# Patient Record
Sex: MALE | Race: WHITE | NOT HISPANIC OR LATINO
[De-identification: names, ages, dates, MRNs, and addresses within clinical notes are randomized per-mention and may not be internally consistent; named-entity substitution may affect disease eponyms.]

---

## 2020-04-26 ENCOUNTER — MESSAGE (OUTPATIENT)
Age: 30
End: 2020-04-26

## 2020-05-06 ENCOUNTER — APPOINTMENT (OUTPATIENT)
Dept: DISASTER EMERGENCY | Facility: HOSPITAL | Age: 30
End: 2020-05-06

## 2020-05-07 LAB
SARS-COV-2 IGG SERPL IA-ACNC: 1.2 AU/ML
SARS-COV-2 IGG SERPL QL IA: NEGATIVE

## 2020-10-29 ENCOUNTER — TRANSCRIPTION ENCOUNTER (OUTPATIENT)
Age: 30
End: 2020-10-29

## 2020-12-07 PROBLEM — Z00.00 ENCOUNTER FOR PREVENTIVE HEALTH EXAMINATION: Status: ACTIVE | Noted: 2020-12-07

## 2020-12-24 ENCOUNTER — APPOINTMENT (OUTPATIENT)
Dept: CARDIOLOGY | Facility: CLINIC | Age: 30
End: 2020-12-24
Payer: COMMERCIAL

## 2020-12-24 ENCOUNTER — NON-APPOINTMENT (OUTPATIENT)
Age: 30
End: 2020-12-24

## 2020-12-24 VITALS
SYSTOLIC BLOOD PRESSURE: 132 MMHG | HEIGHT: 67 IN | WEIGHT: 220 LBS | OXYGEN SATURATION: 97 % | HEART RATE: 77 BPM | DIASTOLIC BLOOD PRESSURE: 88 MMHG | BODY MASS INDEX: 34.53 KG/M2 | TEMPERATURE: 98.5 F

## 2020-12-24 DIAGNOSIS — M77.8 OTHER ENTHESOPATHIES, NOT ELSEWHERE CLASSIFIED: ICD-10-CM

## 2020-12-24 DIAGNOSIS — Z78.9 OTHER SPECIFIED HEALTH STATUS: ICD-10-CM

## 2020-12-24 DIAGNOSIS — Z83.438 FAMILY HISTORY OF OTHER DISORDER OF LIPOPROTEIN METABOLISM AND OTHER LIPIDEMIA: ICD-10-CM

## 2020-12-24 DIAGNOSIS — Z82.3 FAMILY HISTORY OF STROKE: ICD-10-CM

## 2020-12-24 DIAGNOSIS — Z82.49 FAMILY HISTORY OF ISCHEMIC HEART DISEASE AND OTHER DISEASES OF THE CIRCULATORY SYSTEM: ICD-10-CM

## 2020-12-24 DIAGNOSIS — M25.511 PAIN IN RIGHT SHOULDER: ICD-10-CM

## 2020-12-24 DIAGNOSIS — K21.9 GASTRO-ESOPHAGEAL REFLUX DISEASE W/OUT ESOPHAGITIS: ICD-10-CM

## 2020-12-24 PROCEDURE — 99385 PREV VISIT NEW AGE 18-39: CPT

## 2020-12-24 PROCEDURE — 99072 ADDL SUPL MATRL&STAF TM PHE: CPT

## 2020-12-24 PROCEDURE — 93000 ELECTROCARDIOGRAM COMPLETE: CPT

## 2020-12-24 RX ORDER — FAMOTIDINE 10 MG/1
10 TABLET, FILM COATED ORAL
Refills: 0 | Status: ACTIVE | COMMUNITY
Start: 2020-12-24

## 2020-12-24 NOTE — REVIEW OF SYSTEMS
[Back Pain] : back pain [Negative] : Heme/Lymph [Joint Pain] : joint pain [FreeTextEntry6] : snoring

## 2020-12-24 NOTE — HISTORY OF PRESENT ILLNESS
[FreeTextEntry1] : Patient here to establish care and for annual physical exam.  [de-identified] : Rey is a 29yo male who presents today for his annual physical exam. Patient has PMH of GERD and back pain. He states he is feeling well, currently trying to go on disability for chronic back pain, uses Aleve prn for pain. Patient with no additional issues or concerns for today. Patient denies chest pain, shortness of breath, palpitations, dizziness, vision changes, n/v, abdominal pain, changes in bowel/bladder habits,  or appetite. pt also with right shoulder discomfort pt also with snoring /pt seeing dissiability physician

## 2020-12-24 NOTE — PHYSICAL EXAM
[No Acute Distress] : no acute distress [Well Nourished] : well nourished [Well Developed] : well developed [Well-Appearing] : well-appearing [Normal Sclera/Conjunctiva] : normal sclera/conjunctiva [PERRL] : pupils equal round and reactive to light [EOMI] : extraocular movements intact [Normal Outer Ear/Nose] : the outer ears and nose were normal in appearance [Normal Oropharynx] : the oropharynx was normal [No JVD] : no jugular venous distention [No Lymphadenopathy] : no lymphadenopathy [Supple] : supple [Thyroid Normal, No Nodules] : the thyroid was normal and there were no nodules present [No Respiratory Distress] : no respiratory distress  [No Accessory Muscle Use] : no accessory muscle use [Clear to Auscultation] : lungs were clear to auscultation bilaterally [Normal Rate] : normal rate  [Regular Rhythm] : with a regular rhythm [Normal S1, S2] : normal S1 and S2 [No Murmur] : no murmur heard [No Carotid Bruits] : no carotid bruits [No Abdominal Bruit] : a ~M bruit was not heard ~T in the abdomen [No Varicosities] : no varicosities [Pedal Pulses Present] : the pedal pulses are present [No Edema] : there was no peripheral edema [No Palpable Aorta] : no palpable aorta [No Extremity Clubbing/Cyanosis] : no extremity clubbing/cyanosis [Soft] : abdomen soft [Non Tender] : non-tender [Non-distended] : non-distended [No Masses] : no abdominal mass palpated [No HSM] : no HSM [Normal Bowel Sounds] : normal bowel sounds [Normal Posterior Cervical Nodes] : no posterior cervical lymphadenopathy [Normal Anterior Cervical Nodes] : no anterior cervical lymphadenopathy [No CVA Tenderness] : no CVA  tenderness [No Spinal Tenderness] : no spinal tenderness [No Joint Swelling] : no joint swelling [Grossly Normal Strength/Tone] : grossly normal strength/tone [No Rash] : no rash [Coordination Grossly Intact] : coordination grossly intact [No Focal Deficits] : no focal deficits [Normal Gait] : normal gait [Normal Affect] : the affect was normal [Normal Insight/Judgement] : insight and judgment were intact [de-identified] : clear discharge from nares  [de-identified] : normal genitals  [de-identified] : nevi noted

## 2021-01-26 ENCOUNTER — RX RENEWAL (OUTPATIENT)
Age: 31
End: 2021-01-26

## 2021-01-26 RX ORDER — FLUTICASONE PROPIONATE 50 UG/1
50 SPRAY, METERED NASAL
Qty: 16 | Refills: 1 | Status: ACTIVE | COMMUNITY
Start: 2020-12-24 | End: 1900-01-01

## 2021-02-22 ENCOUNTER — APPOINTMENT (OUTPATIENT)
Dept: DERMATOLOGY | Facility: CLINIC | Age: 31
End: 2021-02-22

## 2021-04-23 ENCOUNTER — RESULT REVIEW (OUTPATIENT)
Age: 31
End: 2021-04-23

## 2021-04-23 ENCOUNTER — INPATIENT (INPATIENT)
Facility: HOSPITAL | Age: 31
LOS: 1 days | Discharge: HOME | DRG: 378 | End: 2021-04-25
Attending: INTERNAL MEDICINE | Admitting: INTERNAL MEDICINE
Payer: COMMERCIAL

## 2021-04-23 VITALS
HEART RATE: 116 BPM | TEMPERATURE: 100 F | DIASTOLIC BLOOD PRESSURE: 74 MMHG | SYSTOLIC BLOOD PRESSURE: 134 MMHG | OXYGEN SATURATION: 97 % | WEIGHT: 201.94 LBS | RESPIRATION RATE: 16 BRPM | HEIGHT: 67 IN

## 2021-04-23 DIAGNOSIS — R79.89 OTHER SPECIFIED ABNORMAL FINDINGS OF BLOOD CHEMISTRY: ICD-10-CM

## 2021-04-23 DIAGNOSIS — R55 SYNCOPE AND COLLAPSE: ICD-10-CM

## 2021-04-23 DIAGNOSIS — Z29.9 ENCOUNTER FOR PROPHYLACTIC MEASURES, UNSPECIFIED: ICD-10-CM

## 2021-04-23 DIAGNOSIS — M54.9 DORSALGIA, UNSPECIFIED: ICD-10-CM

## 2021-04-23 DIAGNOSIS — K92.2 GASTROINTESTINAL HEMORRHAGE, UNSPECIFIED: ICD-10-CM

## 2021-04-23 DIAGNOSIS — R00.0 TACHYCARDIA, UNSPECIFIED: ICD-10-CM

## 2021-04-23 LAB
ALBUMIN SERPL ELPH-MCNC: 4.9 G/DL — SIGNIFICANT CHANGE UP (ref 3.3–5)
ALP SERPL-CCNC: 70 U/L — SIGNIFICANT CHANGE UP (ref 40–120)
ALT FLD-CCNC: 62 U/L — HIGH (ref 10–45)
ANION GAP SERPL CALC-SCNC: 11 MMOL/L — SIGNIFICANT CHANGE UP (ref 5–17)
APTT BLD: 24 SEC — LOW (ref 27.5–35.5)
AST SERPL-CCNC: 25 U/L — SIGNIFICANT CHANGE UP (ref 10–40)
BASE EXCESS BLDV CALC-SCNC: -1.4 MMOL/L — SIGNIFICANT CHANGE UP (ref -2–2)
BASOPHILS # BLD AUTO: 0.07 K/UL — SIGNIFICANT CHANGE UP (ref 0–0.2)
BASOPHILS NFR BLD AUTO: 0.5 % — SIGNIFICANT CHANGE UP (ref 0–2)
BILIRUB SERPL-MCNC: 0.3 MG/DL — SIGNIFICANT CHANGE UP (ref 0.2–1.2)
BUN SERPL-MCNC: 35 MG/DL — HIGH (ref 7–23)
CA-I SERPL-SCNC: 1.23 MMOL/L — SIGNIFICANT CHANGE UP (ref 1.12–1.3)
CALCIUM SERPL-MCNC: 10.4 MG/DL — SIGNIFICANT CHANGE UP (ref 8.4–10.5)
CHLORIDE BLDV-SCNC: 110 MMOL/L — HIGH (ref 96–108)
CHLORIDE SERPL-SCNC: 104 MMOL/L — SIGNIFICANT CHANGE UP (ref 96–108)
CO2 BLDV-SCNC: 24 MMOL/L — SIGNIFICANT CHANGE UP (ref 22–30)
CO2 SERPL-SCNC: 22 MMOL/L — SIGNIFICANT CHANGE UP (ref 22–31)
CREAT SERPL-MCNC: 0.8 MG/DL — SIGNIFICANT CHANGE UP (ref 0.5–1.3)
EOSINOPHIL # BLD AUTO: 0.07 K/UL — SIGNIFICANT CHANGE UP (ref 0–0.5)
EOSINOPHIL NFR BLD AUTO: 0.5 % — SIGNIFICANT CHANGE UP (ref 0–6)
GAS PNL BLDV: 136 MMOL/L — SIGNIFICANT CHANGE UP (ref 135–145)
GAS PNL BLDV: SIGNIFICANT CHANGE UP
GLUCOSE BLDV-MCNC: 102 MG/DL — HIGH (ref 70–99)
GLUCOSE SERPL-MCNC: 104 MG/DL — HIGH (ref 70–99)
HCO3 BLDV-SCNC: 23 MMOL/L — SIGNIFICANT CHANGE UP (ref 21–29)
HCT VFR BLD CALC: 33.7 % — LOW (ref 39–50)
HCT VFR BLD CALC: 44 % — SIGNIFICANT CHANGE UP (ref 39–50)
HCT VFR BLDA CALC: 40 % — SIGNIFICANT CHANGE UP (ref 39–50)
HGB BLD CALC-MCNC: 12.9 G/DL — LOW (ref 13–17)
HGB BLD-MCNC: 11.2 G/DL — LOW (ref 13–17)
HGB BLD-MCNC: 14.1 G/DL — SIGNIFICANT CHANGE UP (ref 13–17)
IMM GRANULOCYTES NFR BLD AUTO: 0.5 % — SIGNIFICANT CHANGE UP (ref 0–1.5)
INR BLD: 1.07 RATIO — SIGNIFICANT CHANGE UP (ref 0.88–1.16)
LACTATE BLDV-MCNC: 1.1 MMOL/L — SIGNIFICANT CHANGE UP (ref 0.7–2)
LYMPHOCYTES # BLD AUTO: 2.72 K/UL — SIGNIFICANT CHANGE UP (ref 1–3.3)
LYMPHOCYTES # BLD AUTO: 21.3 % — SIGNIFICANT CHANGE UP (ref 13–44)
MAGNESIUM SERPL-MCNC: 2.1 MG/DL — SIGNIFICANT CHANGE UP (ref 1.6–2.6)
MCHC RBC-ENTMCNC: 27.5 PG — SIGNIFICANT CHANGE UP (ref 27–34)
MCHC RBC-ENTMCNC: 27.8 PG — SIGNIFICANT CHANGE UP (ref 27–34)
MCHC RBC-ENTMCNC: 32 GM/DL — SIGNIFICANT CHANGE UP (ref 32–36)
MCHC RBC-ENTMCNC: 33.2 GM/DL — SIGNIFICANT CHANGE UP (ref 32–36)
MCV RBC AUTO: 83.6 FL — SIGNIFICANT CHANGE UP (ref 80–100)
MCV RBC AUTO: 85.9 FL — SIGNIFICANT CHANGE UP (ref 80–100)
MONOCYTES # BLD AUTO: 1.02 K/UL — HIGH (ref 0–0.9)
MONOCYTES NFR BLD AUTO: 8 % — SIGNIFICANT CHANGE UP (ref 2–14)
NEUTROPHILS # BLD AUTO: 8.82 K/UL — HIGH (ref 1.8–7.4)
NEUTROPHILS NFR BLD AUTO: 69.2 % — SIGNIFICANT CHANGE UP (ref 43–77)
NRBC # BLD: 0 /100 WBCS — SIGNIFICANT CHANGE UP (ref 0–0)
NRBC # BLD: 0 /100 WBCS — SIGNIFICANT CHANGE UP (ref 0–0)
OTHER CELLS CSF MANUAL: 13 ML/DL — LOW (ref 18–22)
PCO2 BLDV: 41 MMHG — LOW (ref 42–55)
PH BLDV: 7.37 — SIGNIFICANT CHANGE UP (ref 7.35–7.45)
PLATELET # BLD AUTO: 320 K/UL — SIGNIFICANT CHANGE UP (ref 150–400)
PLATELET # BLD AUTO: 357 K/UL — SIGNIFICANT CHANGE UP (ref 150–400)
PO2 BLDV: 40 MMHG — SIGNIFICANT CHANGE UP (ref 25–45)
POTASSIUM BLDV-SCNC: 3.9 MMOL/L — SIGNIFICANT CHANGE UP (ref 3.5–5.3)
POTASSIUM SERPL-MCNC: 4.4 MMOL/L — SIGNIFICANT CHANGE UP (ref 3.5–5.3)
POTASSIUM SERPL-SCNC: 4.4 MMOL/L — SIGNIFICANT CHANGE UP (ref 3.5–5.3)
PROT SERPL-MCNC: 8.1 G/DL — SIGNIFICANT CHANGE UP (ref 6–8.3)
PROTHROM AB SERPL-ACNC: 12.8 SEC — SIGNIFICANT CHANGE UP (ref 10.6–13.6)
RBC # BLD: 4.03 M/UL — LOW (ref 4.2–5.8)
RBC # BLD: 5.12 M/UL — SIGNIFICANT CHANGE UP (ref 4.2–5.8)
RBC # BLD: 5.12 M/UL — SIGNIFICANT CHANGE UP (ref 4.2–5.8)
RBC # FLD: 12.7 % — SIGNIFICANT CHANGE UP (ref 10.3–14.5)
RBC # FLD: 12.7 % — SIGNIFICANT CHANGE UP (ref 10.3–14.5)
RETICS #: 91.1 K/UL — SIGNIFICANT CHANGE UP (ref 25–125)
RETICS/RBC NFR: 1.8 % — SIGNIFICANT CHANGE UP (ref 0.5–2.5)
SAO2 % BLDV: 74 % — SIGNIFICANT CHANGE UP (ref 67–88)
SARS-COV-2 RNA SPEC QL NAA+PROBE: SIGNIFICANT CHANGE UP
SODIUM SERPL-SCNC: 137 MMOL/L — SIGNIFICANT CHANGE UP (ref 135–145)
WBC # BLD: 12.77 K/UL — HIGH (ref 3.8–10.5)
WBC # BLD: 17.95 K/UL — HIGH (ref 3.8–10.5)
WBC # FLD AUTO: 12.77 K/UL — HIGH (ref 3.8–10.5)
WBC # FLD AUTO: 17.95 K/UL — HIGH (ref 3.8–10.5)

## 2021-04-23 PROCEDURE — 71046 X-RAY EXAM CHEST 2 VIEWS: CPT | Mod: 26

## 2021-04-23 PROCEDURE — 88305 TISSUE EXAM BY PATHOLOGIST: CPT | Mod: 26

## 2021-04-23 PROCEDURE — 99285 EMERGENCY DEPT VISIT HI MDM: CPT

## 2021-04-23 PROCEDURE — 93010 ELECTROCARDIOGRAM REPORT: CPT

## 2021-04-23 PROCEDURE — 99221 1ST HOSP IP/OBS SF/LOW 40: CPT | Mod: GC

## 2021-04-23 RX ORDER — SODIUM CHLORIDE 9 MG/ML
500 INJECTION INTRAMUSCULAR; INTRAVENOUS; SUBCUTANEOUS
Refills: 0 | Status: COMPLETED | OUTPATIENT
Start: 2021-04-23 | End: 2021-04-23

## 2021-04-23 RX ORDER — PANTOPRAZOLE SODIUM 20 MG/1
8 TABLET, DELAYED RELEASE ORAL
Qty: 80 | Refills: 0 | Status: DISCONTINUED | OUTPATIENT
Start: 2021-04-23 | End: 2021-04-25

## 2021-04-23 RX ORDER — PANTOPRAZOLE SODIUM 20 MG/1
40 TABLET, DELAYED RELEASE ORAL ONCE
Refills: 0 | Status: COMPLETED | OUTPATIENT
Start: 2021-04-23 | End: 2021-04-23

## 2021-04-23 RX ORDER — ACETAMINOPHEN 500 MG
1000 TABLET ORAL ONCE
Refills: 0 | Status: COMPLETED | OUTPATIENT
Start: 2021-04-23 | End: 2021-04-23

## 2021-04-23 RX ORDER — PANTOPRAZOLE SODIUM 20 MG/1
80 TABLET, DELAYED RELEASE ORAL ONCE
Refills: 0 | Status: COMPLETED | OUTPATIENT
Start: 2021-04-23 | End: 2021-04-23

## 2021-04-23 RX ORDER — SODIUM CHLORIDE 9 MG/ML
1000 INJECTION, SOLUTION INTRAVENOUS
Refills: 0 | Status: DISCONTINUED | OUTPATIENT
Start: 2021-04-23 | End: 2021-04-25

## 2021-04-23 RX ORDER — SODIUM CHLORIDE 9 MG/ML
1000 INJECTION, SOLUTION INTRAVENOUS ONCE
Refills: 0 | Status: COMPLETED | OUTPATIENT
Start: 2021-04-23 | End: 2021-04-23

## 2021-04-23 RX ADMIN — Medication 400 MILLIGRAM(S): at 18:42

## 2021-04-23 RX ADMIN — SODIUM CHLORIDE 150 MILLILITER(S): 9 INJECTION, SOLUTION INTRAVENOUS at 14:55

## 2021-04-23 RX ADMIN — PANTOPRAZOLE SODIUM 10 MG/HR: 20 TABLET, DELAYED RELEASE ORAL at 15:18

## 2021-04-23 RX ADMIN — SODIUM CHLORIDE 30 MILLILITER(S): 9 INJECTION INTRAMUSCULAR; INTRAVENOUS; SUBCUTANEOUS at 18:35

## 2021-04-23 RX ADMIN — PANTOPRAZOLE SODIUM 40 MILLIGRAM(S): 20 TABLET, DELAYED RELEASE ORAL at 13:54

## 2021-04-23 RX ADMIN — SODIUM CHLORIDE 4000 MILLILITER(S): 9 INJECTION, SOLUTION INTRAVENOUS at 13:36

## 2021-04-23 RX ADMIN — Medication 1000 MILLIGRAM(S): at 19:02

## 2021-04-23 RX ADMIN — PANTOPRAZOLE SODIUM 40 MILLIGRAM(S): 20 TABLET, DELAYED RELEASE ORAL at 13:37

## 2021-04-23 NOTE — CONSULT NOTE ADULT - PROBLEM SELECTOR RECOMMENDATION 9
Suspect NSAID related ulcer causing upper GI bleed  -IV fluid boluses until vital signs normal  -CBC q8h  -protonix 80mg IV bolus then infusion 8mg/h  -EGD today

## 2021-04-23 NOTE — ED ADULT TRIAGE NOTE - CHIEF COMPLAINT QUOTE
black stools had near syncope this morning when trying to urinate   states he takes aleve almost every other day

## 2021-04-23 NOTE — ED ADULT NURSE NOTE - OBJECTIVE STATEMENT
30 year old male presents to ED c/o dark stools x 2 today with 1 episode of nausea and dizziness. Patient is hypertensive and tachycardic on the monitor. Patient denies and pain or symptoms at this time. No PMH. Does not take any medications. IV fluids in progress.

## 2021-04-23 NOTE — PRE PROCEDURE NOTE - PRE PROCEDURE EVALUATION
Attending Physician:                            Procedure:    Indication for Procedure:  ________________________________________________________  PAST MEDICAL & SURGICAL HISTORY:  No pertinent past medical history    No significant past surgical history      ALLERGIES:  No Known Allergies    HOME MEDICATIONS:    AICD/PPM: [ ] yes   [ ] no    PERTINENT LAB DATA:                        14.1   12.77 )-----------( 357      ( 23 Apr 2021 12:47 )             44.0     04-23    137  |  104  |  35<H>  ----------------------------<  104<H>  4.4   |  22  |  0.80    Ca    10.4      23 Apr 2021 12:48  Mg     2.1     04-23    TPro  8.1  /  Alb  4.9  /  TBili  0.3  /  DBili  x   /  AST  25  /  ALT  62<H>  /  AlkPhos  70  04-23    PT/INR - ( 23 Apr 2021 12:47 )   PT: 12.8 sec;   INR: 1.07 ratio         PTT - ( 23 Apr 2021 12:47 )  PTT:24.0 sec            PHYSICAL EXAMINATION:    Height (cm): 170.2  Weight (kg): 91.6  BMI (kg/m2): 31.6  BSA (m2): 2.03T(C): 36.1  HR: 106  BP: 108/71  RR: 18  SpO2: 97%    Constitutional: NAD  HEENT: PERRLA, EOMI,    Neck:  No JVD  Respiratory: CTAB/L  Cardiovascular: S1 and S2  Gastrointestinal: BS+, soft, NT/ND  Extremities: No peripheral edema  Neurological: A/O x 3, no focal deficits  Psychiatric: Normal mood, normal affect  Skin: No rashes    ASA Class: I [ x]  II [ ]  III [ ]  IV [ ]    COMMENTS:    The patient is a suitable candidate for the planned procedure unless box checked [ ]  No, explain:

## 2021-04-23 NOTE — H&P ADULT - PROBLEM SELECTOR PLAN 1
acute onset black stool and abdominal pain  GI eval called,   start IV hydration and protonix drip   serial CBC Q12hrs for now  active T7S, transfuse to keep hgb above 7  Keep NPO  plan for EGD per GI

## 2021-04-23 NOTE — PRE-ANESTHESIA EVALUATION ADULT - NSANTHOSAYNRD_GEN_A_CORE
No. DELISA screening performed.  STOP BANG Legend: 0-2 = LOW Risk; 3-4 = INTERMEDIATE Risk; 5-8 = HIGH Risk

## 2021-04-23 NOTE — CONSULT NOTE ADULT - SUBJECTIVE AND OBJECTIVE BOX
CHIEF COMPLAINT:Patient is a 30y old  Male who presents with a chief complaint of GIB (23 Apr 2021 14:22)      HISTORY OF PRESENT ILLNESS:HPI:  Patient is a 31 Y/O Male with no pertinent PMH p/w lightheadedness starting this AM. Pt reports going to urinate this AM and starting to feel lightheaded. Denies chest pain, sob, palpitations. The symptoms improved after a few seconds of sitting down. He took a nap and when he awoke he has mild epigastric discomfort and urge to have a BM. Reports two dark colored BMs without obvious blood. Denies symptoms at this time. States that he take naproxen a few times per week as directed on the packaging for back pain. Denies n/v, fevers, chills. Denies having colonoscopy/endoscopy in the past. No hx of colon cancer in the family that he knows of. (23 Apr 2021 14:22)      PAST MEDICAL & SURGICAL HISTORY:  No pertinent past medical history  No significant past surgical history    MEDICATIONS:  pantoprazole Infusion 8 mG/Hr IV Continuous <Continuous>  lactated ringers. 1000 milliLiter(s) IV Continuous <Continuous>    FAMILY HISTORY  Non-contributory    SOCIAL HISTORY:    [ ] Tobacco  [ ] Drugs  [ ] Alcohol    Allergies  No Known Allergies  Intolerances    REVIEW OF SYSTEMS:  CONSTITUTIONAL: No fever  EYES: No eye pain, visual disturbances, or discharge  ENMT:  No difficulty hearing, tinnitus  NECK: No pain or stiffness  RESPIRATORY: No cough, wheezing,  CARDIOVASCULAR: No chest pain, palpitations, passing out, dizziness, or leg swelling  GASTROINTESTINAL:  No nausea, vomiting, diarrhea or constipation. No melena. + GIB   GENITOURINARY: No dysuria, hematuria  NEUROLOGICAL: No stroke like symptoms  SKIN: No burning or lesions   ENDOCRINE: No heat or cold intolerance  MUSCULOSKELETAL: No joint pain or swelling  PSYCHIATRIC: No  anxiety, mood swings  HEME/LYMPH: No bleeding gums  ALLERGY AND IMMUNOLOGIC: No hives or eczema	    All other ROS negative    PHYSICAL EXAM:  T(C): 37.5 (04-23-21 @ 11:58), Max: 37.5 (04-23-21 @ 11:58)  HR: 116 (04-23-21 @ 11:58) (116 - 116)  BP: 134/74 (04-23-21 @ 11:58) (134/74 - 134/74)  RR: 16 (04-23-21 @ 11:58) (16 - 16)  SpO2: 97% (04-23-21 @ 11:58) (97% - 97%)  Wt(kg): --  I&O's Summary      Appearance: Normal	  HEENT:   Normal oral mucosa, EOMI	  Cardiovascular:  S1 S2, No JVD,    Respiratory: Lungs clear to auscultation	  Psychiatry: Alert  Gastrointestinal:  Soft, Non-tender, + BS	  Skin: No rashes   Neurologic: Non-focal  Extremities:  No edema  Vascular: Peripheral pulses palpable    	    	  CARDIAC MARKERS:  Labs personally reviewed by me                          14.1   12.77 )-----------( 357      ( 23 Apr 2021 12:47 )             44.0     04-23    137  |  104  |  35<H>  ----------------------------<  104<H>  4.4   |  22  |  0.80    Ca    10.4      23 Apr 2021 12:48  Mg     2.1     04-23    TPro  8.1  /  Alb  4.9  /  TBili  0.3  /  DBili  x   /  AST  25  /  ALT  62<H>  /  AlkPhos  70  04-23    EKG: Personally reviewed by me -   Radiology: Personally reviewed by me -     Assessment /Plan:   Patient is a 31 Y/O Male with no pertinent PMH p/w lightheadedness starting this AM. Pt reports going to urinate this AM and starting to feel lightheaded. Denies chest pain, sob, palpitations. The symptoms improved after a few seconds of sitting down. He took a nap and when he awoke he has mild epigastric discomfort and urge to have a BM. Reports two dark colored BM's without obvious blood.     Problem/Plan - 1:  ·  Problem: Near syncope, while urinating, likely vasovagal  monitor vital   workup for GIB        Problem/Plan - 2:  ·  Problem: GIB (gastrointestinal bleeding).  Plan: Acute onset black stool and abdominal pain  IV hydration and protonix drip   Keep NPO, plan for EGD per GI.   pending GI eval        Problem/Plan - 3:  ·  Problem: Prophylactic measure.  Plan: DVT and GI PPX         Michelle Gerber ANP-C  Spencer Samuels, DO [facility]  Cardiovascular Medicine  [address] [place] [address], [address]  Office [phone]  Cell [phone] Date of Service: 4/23/21    CHIEF COMPLAINT: Patient is a 30y old  Male who presents with a chief complaint of GIB (23 Apr 2021 14:22)      HISTORY OF PRESENT ILLNESS:HPI:  Patient is a 29 Y/O Male with no pertinent PMH p/w lightheadedness starting this AM. Pt reports going to urinate this AM and starting to feel lightheaded. Denies chest pain, sob, palpitations. The symptoms improved after a few seconds of sitting down. He took a nap and when he awoke he has mild epigastric discomfort and urge to have a BM. Reports two dark colored BMs without obvious blood. Denies symptoms at this time. States that he take naproxen a few times per week as directed on the packaging for back pain. Denies n/v, fevers, chills. Denies having colonoscopy/endoscopy in the past. No hx of colon cancer in the family that he knows of. (23 Apr 2021 14:22)      PAST MEDICAL & SURGICAL HISTORY:  No pertinent past medical history  No significant past surgical history    MEDICATIONS:  pantoprazole Infusion 8 mG/Hr IV Continuous <Continuous>  lactated ringers. 1000 milliLiter(s) IV Continuous <Continuous>    FAMILY HISTORY  Non-contributory    SOCIAL HISTORY:    not a smoker    Allergies  No Known Allergies  Intolerances    REVIEW OF SYSTEMS:  CONSTITUTIONAL: No fever  EYES: No eye pain, visual disturbances, or discharge  ENMT:  No difficulty hearing, tinnitus  NECK: No pain or stiffness  RESPIRATORY: No cough, wheezing,  CARDIOVASCULAR: No chest pain, palpitations, passing out, dizziness, or leg swelling  GASTROINTESTINAL:  No nausea, vomiting, diarrhea or constipation. No melena. + GIB   GENITOURINARY: No dysuria, hematuria  NEUROLOGICAL: No stroke like symptoms  SKIN: No burning or lesions   ENDOCRINE: No heat or cold intolerance  MUSCULOSKELETAL: No joint pain or swelling  PSYCHIATRIC: No  anxiety, mood swings  HEME/LYMPH: No bleeding gums  ALLERGY AND IMMUNOLOGIC: No hives or eczema	    All other ROS negative    PHYSICAL EXAM:  T(C): 37.5 (04-23-21 @ 11:58), Max: 37.5 (04-23-21 @ 11:58)  HR: 116 (04-23-21 @ 11:58) (116 - 116)  BP: 134/74 (04-23-21 @ 11:58) (134/74 - 134/74)  RR: 16 (04-23-21 @ 11:58) (16 - 16)  SpO2: 97% (04-23-21 @ 11:58) (97% - 97%)  Wt(kg): --  I&O's Summary      Appearance: Normal	  HEENT:   Normal oral mucosa, EOMI	  Cardiovascular:  S1 S2, No JVD,    Respiratory: Lungs clear to auscultation	  Psychiatry: Alert  Gastrointestinal:  Soft, Non-tender, + BS	  Skin: No rashes   Neurologic: Non-focal  Extremities:  No edema  Vascular: Peripheral pulses palpable    	    	  CARDIAC MARKERS:  Labs personally reviewed by me                          14.1   12.77 )-----------( 357      ( 23 Apr 2021 12:47 )             44.0     04-23    137  |  104  |  35<H>  ----------------------------<  104<H>  4.4   |  22  |  0.80    Ca    10.4      23 Apr 2021 12:48  Mg     2.1     04-23    TPro  8.1  /  Alb  4.9  /  TBili  0.3  /  DBili  x   /  AST  25  /  ALT  62<H>  /  AlkPhos  70  04-23    EKG: Personally reviewed by me -   Radiology: Personally reviewed by me -     Assessment /Plan:   Patient is a 29 Y/O Male with no pertinent PMH p/w lightheadedness starting this AM. Pt reports going to urinate this AM and starting to feel lightheaded. Denies chest pain, sob, palpitations. The symptoms improved after a few seconds of sitting down. He took a nap and when he awoke he has mild epigastric discomfort and urge to have a BM. Reports two dark colored BM's without obvious blood.     Problem/Plan - 1:  ·  Problem: Near syncope, while urinating, likely vasovagal 2/2 upper GIB  monitor vital   workup for GIB        Problem/Plan - 2:  ·  Problem: GIB (gastrointestinal bleeding).  Plan: Acute onset black stool and abdominal pain  IV hydration and protonix drip   Keep NPO, plan for EGD per GI.   GI eval for EGD     Problem/Plan - 3:  ·  Problem: Preop Risk stratification  Plan: Low risk for low risk EGD procedure, no contraindication to proceed       Problem/Plan - 4:  ·  Problem: Prophylactic measure.  Plan: DVT and GI PPX     I had a conversation with the patient regarding hospital course, differential diagnosis and results of diagnostic tests thus far.  Plan of care discussed with patient after the evaluation. Patient expresses clear understanding and satisfaction with the plan of care. One hundred ten minutes spent on total encounter, of which more than fifty percent of the encounter was spent on counseling and/or coordinating care by the attending physician.      Michelle Samuels DO [facility]  Cardiovascular Medicine  [address] [place] [address], [address]  Office [phone]  Cell [phone]

## 2021-04-23 NOTE — H&P ADULT - NSHPREVIEWOFSYSTEMS_GEN_ALL_CORE
REVIEW OF SYSTEMS:    CONSTITUTIONAL: + weakness  EYES/ENT: No visual changes;  No vertigo or throat pain   NECK: No pain or stiffness  RESPIRATORY: No cough, wheezing, hemoptysis; No shortness of breath  CARDIOVASCULAR: No chest pain or palpitations  GASTROINTESTINAL: + dark black stool   GENITOURINARY: No dysuria, frequency or hematuria  NEUROLOGICAL: + lightheaded   SKIN: No itching, burning, rashes, or lesions   All other review of systems is negative unless indicated above.

## 2021-04-23 NOTE — ED ADULT TRIAGE NOTE - BP NONINVASIVE DIASTOLIC (MM HG)
"Zio report received noting VT, ?CHB, 2*AVB.     Telephoned patient to inquire on symptoms. He states he has been feeling well. Denies dizziness, lightheadedness. He states he has been feeling tired, but that is unchanged from before valve \"but maybe a little better.\" He checks his BP at home and states it's been 110-120/70's. He states he has fallen at home, 2-3 times after discharge but states \"I just lost my balance.\" No injuries or bruising from these falls. He denies any lightheadedness, near-syncope. He is taking his amlodipine and lopressor as ordered. Will review with Dr. Colbert and call patient back with recommendations.    Cristina Reynolds RN  [facility]    Dr. Perez has reviewed patient's ZIO patch results.  1/29/2021 patient had 2nd degree AVB, mobitz I.  He was asymptomatic.  He is s/p TAVR 2 weeks with Goldstein valve.  Dr. Perez wants patient to stop his Toprol 12.5 mg bid today and then recheck event monitor in 2 weeks.  I called patient and he verbalized understanding.      " 74

## 2021-04-23 NOTE — CONSULT NOTE ADULT - ASSESSMENT
30 year old man presents with dizziness and melanotic stool, tachy in ED     Recommendations:  -continue to monitor hemodynamics  -trend H/H  -Resuscitation  -EGD with GI today    -d/w Dr. Holden Smith, R3  # [id number]

## 2021-04-23 NOTE — H&P ADULT - HISTORY OF PRESENT ILLNESS
Patient is a 31 Y/O Male with no pertinent PMH p/w lightheadedness starting this AM. Pt reports going to urinate this AM and starting to feel lightheaded. Denies chest pain, sob, palpitations. The symptoms improved after a few seconds of sitting down. He took a nap and when he awoke he has mild epigastric discomfort and urge to have a BM. Reports two dark colored BMs without obvious blood. Denies symptoms at this time. States that he take naproxen a few times per week as directed on the packaging for back pain. Denies n/v, fevers, chills. Denies having colonoscopy/endoscopy in the past. No hx of colon cancer in the family that he knows of.

## 2021-04-23 NOTE — ED PROVIDER NOTE - OBJECTIVE STATEMENT
31 yo M with no pertinent PMH p/w lightheadedness starting this AM. Pt reports going to urinate this AM and starting to feel lightheaded. Denies chest pain, sob, palpitations. The symptoms improved after a few seconds of sitting down. He took a nap and when he awoke he has mild epigastric discomfort and urge to have a BM. Reports two dark colored BMs without obvious blood. Denies symptoms at this time. States that he take naproxen a few times per week as directed on the packaging for back pain. Denies n/v, fevers, chills. Denies having colonoscopy/endoscopy in the past. No hx of colon cancer in the family that he knows of.

## 2021-04-23 NOTE — CONSULT NOTE ADULT - PROBLEM SELECTOR RECOMMENDATION 5
Most likely due to upper GI bleed  -management as above, for EGD today      Plan of care discussed with the patient after the evaluation. Patient expresses a clear understanding of the plan of care.  125 minutes spent on the total encounter, of which more than fifty percent of the encounter was spent on counseling and/or coordinating care by the attending physician.  Advanced care planning forms were discussed. Code status including forceful chest compressions, defibrillation and intubation were discussed. The risks benefits and alternatives to pertinent gastrointestinal procedures and interventions were discussed in detail and all questions were answered. Duration: 15 Minutes.      Jorge Joe M.D.   Gastroenterology and Hepatology  [address] [place], [place]  Office: [phone]  Cell: [phone]

## 2021-04-23 NOTE — ED PROVIDER NOTE - INTERPRETATION
borderline tachycardia/normal sinus rhythm, Normal axis, Normal WV interval and QRS complex. There are no acute ischemic ST or T-wave changes.

## 2021-04-23 NOTE — H&P ADULT - ASSESSMENT
Patient is a 29 Y/O Male with no pertinent PMH p/w lightheadedness starting this AM. Pt reports going to urinate this AM and starting to feel lightheaded. Denies chest pain, sob, palpitations. The symptoms improved after a few seconds of sitting down. He took a nap and when he awoke he has mild epigastric discomfort and urge to have a BM. Reports two dark colored BMs without obvious blood.

## 2021-04-23 NOTE — ED PROVIDER NOTE - ATTENDING CONTRIBUTION TO CARE
------------ATTENDING NOTE------------  pt c/o feeling lightheaded this AM while standing urinating, describing sudden onset sensation of near passing out, felt clammy, no chest pain/discomfort or sob/dyspnea or palpitations, improved w/ laying down, took a nap then awoke and felt urge to have a BM, describes having to strain w/ BM and describes stool looked dark (no red/gross blood), felt better, later had another BM w/ looser stool that he describes as darker (again, no red/gross blood), no current abd pain/discomfort, has GERD at times, uses Naproxen several times a week for back pain, no h/o PUD, overall well appearing, sinus tachycardia to low 100's, abd soft nontender/nondistended +bs, equal dsital pulses, clear chest w/o distress, awaiting labs/imaging and close reassessments -->  - Corbin Brian MD   -----------------------------------------------

## 2021-04-23 NOTE — ED PROVIDER NOTE - PHYSICAL EXAMINATION
Gen: Patient is well-appearing, NAD, AAOx3, able to ambulate without assistance.  HEENT: NCAT, EOMI, RENAY, normal conjunctiva, tongue midline, oral mucosa moist.  Lung: CTAB, no respiratory distress, no wheezes/rhonchi/rales B/L, speaking in full sentences.  CV: Tachycardic, no murmurs, rubs or gallops, distal pulses 2+ b/l.  Abd: soft, NT, ND, no guarding, no rigidity, no rebound tenderness.  MSK: no visible deformities, ROM normal in UE/LE, no back TTP.  Neuro: No focal sensory or motor deficits.  Skin: Warm, well perfused, no rash, no leg swelling.  Psych: normal affect, calm.

## 2021-04-23 NOTE — H&P ADULT - PROBLEM/PLAN-1
Advised patient that we needed another specimen. She will come in tomorrow and give us one. Appointment scheduled.   
Please have the patient return to clinic for repeat urinalysis and urine culture and pending those results we can decide if more abx are indicated.  
DISPLAY PLAN FREE TEXT

## 2021-04-23 NOTE — ED PROVIDER NOTE - NS ED ROS FT
Constitutional:  (-) fever, (-) chills, (-) lethargy  Eyes:  (-) eye pain (-) visual changes  ENMT: (-) nasal discharge, (-) sore throat. (-) neck pain or stiffness  Cardiac: (-) chest pain (-) palpitations  Respiratory:  (-) cough (-) respiratory distress.   GI:  (-) nausea (-) vomiting (+) diarrhea (+) abdominal pain.  :  (-) dysuria (-) frequency (-) burning.  MS:  (-) back pain (-) joint pain.  Neuro:  (-) headache (-) numbness (-) tingling (-) focal weakness  Skin:  (-) rash  Except as documented in the HPI,  all other systems are negative

## 2021-04-23 NOTE — CONSULT NOTE ADULT - SUBJECTIVE AND OBJECTIVE BOX
Chief Complaint:  Patient is a 30y old  Male who presents with a chief complaint of     Date of service: 04-23-21 @ 13:30    HPI:    The patient is a     The patient denies dysphagia, nausea and vomiting, abdominal pain, diarrhea, unintentional weight loss, change in bowel habits or NSAID use.      Allergies:  No Known Allergies      Home Medications:    Hospital Medications:  lactated ringers Bolus 1000 milliLiter(s) IV Bolus once  lactated ringers. 1000 milliLiter(s) IV Continuous <Continuous>  pantoprazole  Injectable 40 milliGRAM(s) IV Push Once      PMHX/PSHX:  No pertinent past medical history    No significant past surgical history        Family history:      Social History:   Denies ethanol use.  Denies illicit drug use.    ROS:     General:  No wt loss, fevers, chills, night sweats, fatigue,   Eyes:  Good vision, no reported pain  ENT:  No sore throat, pain, runny nose, dysphagia  CV:  No pain, palpitations, hypo/hypertension  Resp:  No dyspnea, cough, tachypnea, wheezing  GI:  See HPI  :  No pain, bleeding, incontinence, nocturia  Muscle:  No pain, weakness  Neuro:  No weakness, tingling, memory problems  Psych:  No fatigue, insomnia, mood problems, depression  Endocrine:  No polyuria, polydipsia, cold/heat intolerance  Heme:  No petechiae, ecchymosis, easy bruisability  Integumentary:  No rash, edema      PHYSICAL EXAM:     GENERAL:  Appears stated age, well-groomed, well-nourished, no distress  HEENT:  NC/AT,  conjunctivae anicteric, clear and pink,   NECK: supple, trachea midline  CHEST:  Full & symmetric excursion, no increased effort, breath sounds clear  HEART:  Regular rhythm, no JVD  ABDOMEN:  Soft, non-tender, non-distended, normoactive bowel sounds,  no masses , no hepatosplenomegaly  EXTREMITIES:  no cyanosis,clubbing or edema  SKIN:  No rash, erythema, or, ecchymoses, no jaundice  NEURO:  Alert, non-focal, no asterixis  PSYCH: Appropriate affect, oriented to place and time  RECTAL: Deferred      Vital Signs:  Vital Signs Last 24 Hrs  T(C): 37.5 (23 Apr 2021 11:58), Max: 37.5 (23 Apr 2021 11:58)  T(F): 99.5 (23 Apr 2021 11:58), Max: 99.5 (23 Apr 2021 11:58)  HR: 116 (23 Apr 2021 11:58) (116 - 116)  BP: 134/74 (23 Apr 2021 11:58) (134/74 - 134/74)  BP(mean): --  RR: 16 (23 Apr 2021 11:58) (16 - 16)  SpO2: 97% (23 Apr 2021 11:58) (97% - 97%)  Daily Height in cm: 170.18 (23 Apr 2021 11:58)    Daily     LABS: Labs personally reviewed by me:                        14.1   12.77 )-----------( 357      ( 23 Apr 2021 12:47 )             44.0     04-23    137  |  104  |  35<H>  ----------------------------<  104<H>  4.4   |  22  |  0.80    Ca    10.4      23 Apr 2021 12:48  Mg     2.1     04-23    TPro  8.1  /  Alb  4.9  /  TBili  0.3  /  DBili  x   /  AST  25  /  ALT  62<H>  /  AlkPhos  70  04-23    LIVER FUNCTIONS - ( 23 Apr 2021 12:48 )  Alb: 4.9 g/dL / Pro: 8.1 g/dL / ALK PHOS: 70 U/L / ALT: 62 U/L / AST: 25 U/L / GGT: x           PT/INR - ( 23 Apr 2021 12:47 )   PT: 12.8 sec;   INR: 1.07 ratio         PTT - ( 23 Apr 2021 12:47 )  PTT:24.0 sec        Imaging personally reviewed by me:           Chief Complaint:  Patient is a 30y old  Male who presents with a chief complaint of melena    Date of service: 04-23-21 @ 13:30    HPI:    The patient is a 31 Y/O Male with no pertinent PMH p/w lightheadedness starting this AM. Pt reports going to urinate this AM and starting to feel lightheaded. Denies chest pain, sob, palpitations. The symptoms improved after a few seconds of sitting down. He took a nap and when he awoke he has mild epigastric discomfort and urge to have a BM. Reports two dark tarry BMs without obvious blood. Denies symptoms at this time. States that he take naproxen a few times per week as directed on the packaging for back pain. Denies n/v, fevers, chills. Denies having colonoscopy/endoscopy in the past  The patient denies dysphagia, nausea and vomiting,diarrhea, unintentional weight loss, change in bowel habits or NSAID use.    In the ED was noted to be tachycardic, with a normal hemoglobin level    Allergies:  No Known Allergies      Home Medications:    Hospital Medications:  lactated ringers Bolus 1000 milliLiter(s) IV Bolus once  lactated ringers. 1000 milliLiter(s) IV Continuous <Continuous>  pantoprazole  Injectable 40 milliGRAM(s) IV Push Once      PMHX/PSHX:  No pertinent past medical history    No significant past surgical history        Family history:      Social History:   Denies ethanol use.  Denies illicit drug use.    ROS:     General:  No wt loss, fevers, chills, night sweats, fatigue,   Eyes:  Good vision, no reported pain  ENT:  No sore throat, pain, runny nose, dysphagia  CV:  No pain, palpitations, hypo/hypertension  Resp:  No dyspnea, cough, tachypnea, wheezing  GI:  See HPI  :  No pain, bleeding, incontinence, nocturia  Muscle:  No pain, weakness  Neuro:  No weakness, tingling, memory problems  Psych:  No fatigue, insomnia, mood problems, depression  Endocrine:  No polyuria, polydipsia, cold/heat intolerance  Heme:  No petechiae, ecchymosis, easy bruisability  Integumentary:  No rash, edema      PHYSICAL EXAM:     GENERAL:  Appears stated age, well-groomed, well-nourished, no distress  HEENT:  NC/AT,  conjunctivae anicteric, clear and pink,   NECK: supple, trachea midline  CHEST:  Full & symmetric excursion, no increased effort, breath sounds clear  HEART:  Regular rhythm, no JVD, tachy  ABDOMEN:  Soft, non-tender, non-distended, normoactive bowel sounds,  no masses , no hepatosplenomegaly  EXTREMITIES:  no cyanosis,clubbing or edema  SKIN:  No rash, erythema, or, ecchymoses, no jaundice  NEURO:  Alert, non-focal, no asterixis  PSYCH: Appropriate affect, oriented to place and time  RECTAL: Deferred      Vital Signs:  Vital Signs Last 24 Hrs  T(C): 37.5 (23 Apr 2021 11:58), Max: 37.5 (23 Apr 2021 11:58)  T(F): 99.5 (23 Apr 2021 11:58), Max: 99.5 (23 Apr 2021 11:58)  HR: 116 (23 Apr 2021 11:58) (116 - 116)  BP: 134/74 (23 Apr 2021 11:58) (134/74 - 134/74)  BP(mean): --  RR: 16 (23 Apr 2021 11:58) (16 - 16)  SpO2: 97% (23 Apr 2021 11:58) (97% - 97%)  Daily Height in cm: 170.18 (23 Apr 2021 11:58)    Daily     LABS: Labs personally reviewed by me:                        14.1   12.77 )-----------( 357      ( 23 Apr 2021 12:47 )             44.0     04-23    137  |  104  |  35<H>  ----------------------------<  104<H>  4.4   |  22  |  0.80    Ca    10.4      23 Apr 2021 12:48  Mg     2.1     04-23    TPro  8.1  /  Alb  4.9  /  TBili  0.3  /  DBili  x   /  AST  25  /  ALT  62<H>  /  AlkPhos  70  04-23    LIVER FUNCTIONS - ( 23 Apr 2021 12:48 )  Alb: 4.9 g/dL / Pro: 8.1 g/dL / ALK PHOS: 70 U/L / ALT: 62 U/L / AST: 25 U/L / GGT: x           PT/INR - ( 23 Apr 2021 12:47 )   PT: 12.8 sec;   INR: 1.07 ratio         PTT - ( 23 Apr 2021 12:47 )  PTT:24.0 sec        Imaging personally reviewed by me:

## 2021-04-23 NOTE — H&P ADULT - NSHPPHYSICALEXAM_GEN_ALL_CORE
Vital Signs Last 24 Hrs  T(C): 37.5 (23 Apr 2021 11:58), Max: 37.5 (23 Apr 2021 11:58)  T(F): 99.5 (23 Apr 2021 11:58), Max: 99.5 (23 Apr 2021 11:58)  HR: 116 (23 Apr 2021 11:58) (116 - 116)  BP: 134/74 (23 Apr 2021 11:58) (134/74 - 134/74)  BP(mean): --  RR: 16 (23 Apr 2021 11:58) (16 - 16)  SpO2: 97% (23 Apr 2021 11:58) (97% - 97%)    Appearance: Normal	  HEENT:   Normal oral mucosa, PERRL, EOMI	  Lymphatic: No lymphadenopathy , no edema  Cardiovascular: Normal S1 S2, No JVD, No murmurs , Peripheral pulses palpable 2+ bilaterally  Respiratory: Lungs clear to auscultation, normal effort 	  Gastrointestinal:  Soft, Non-tender, + BS	  Skin: No rashes, No ecchymoses, No cyanosis, warm to touch  Musculoskeletal: Normal range of motion, normal strength  Psychiatry:  Mood & affect appropriate  Ext: No edema

## 2021-04-23 NOTE — CONSULT NOTE ADULT - PROBLEM SELECTOR RECOMMENDATION 4
Most likely due to upper GI bleed  -management as above, for EGD today      Plan of care discussed with the patient after the evaluation. Patient expresses a clear understanding of the plan of care.  125 minutes spent on the total encounter, of which more than fifty percent of the encounter was spent on counseling and/or coordinating care by the attending physician.  Advanced care planning forms were discussed. Code status including forceful chest compressions, defibrillation and intubation were discussed. The risks benefits and alternatives to pertinent gastrointestinal procedures and interventions were discussed in detail and all questions were answered. Duration: 15 Minutes.      Jorge Joe M.D.   Gastroenterology and Hepatology  [address] [place], [place]  Office: [phone]  Cell: [phone] chronic back pain. takes NSAIDs.  -will give IV tylenol as pt is NPO

## 2021-04-23 NOTE — CONSULT NOTE ADULT - ATTENDING COMMENTS
I saw and examined the pt this morning at 8:28 am and discussed the tx plan with the House Staff. I agree with the exam and plan as documented in the surgery resident's note from today with comments/changes below.  No c/o.  GI intervention appreciated.  Will sign off.   Luisana Cain MD
Pt care and plan discussed and reviewed with NP. Plan as outlined above edited by me to reflect our discussion.

## 2021-04-23 NOTE — CONSULT NOTE ADULT - SUBJECTIVE AND OBJECTIVE BOX
CC: Patient is a 30y old  Male who presents with a chief complaint of GIB (23 Apr 2021 14:22)    HPI:  Patient is a 31 Y/O Male with no pertinent PMH p/w lightheadedness starting this AM. Pt reports going to urinate this AM and starting to feel lightheaded. Denies chest pain, sob, palpitations. The symptoms improved after a few seconds of sitting down. He took a nap and when he awoke he has mild epigastric discomfort and urge to have a BM. Reports two dark colored BMs without obvious blood. Denies symptoms at this time. States that he take naproxen a few times per week as directed on the packaging for back pain. Denies n/v, fevers, chills. Denies having colonoscopy/endoscopy in the past. No hx of colon cancer in the family that he knows of. (23 Apr 2021 14:22)      PMH  No pertinent past medical history      PSH  No significant past surgical history      MEDS    Allergies    No Known Allergies    Intolerances        Social Patient does not smoke. Drinks alcohol occasionally. Works for IT at [employer]        Physical Exam  T(C): 37.5 (04-23-21 @ 11:58), Max: 37.5 (04-23-21 @ 11:58)  HR: 99 (04-23-21 @ 16:32) (98 - 116)  BP: 148/98 (04-23-21 @ 16:32) (133/95 - 148/98)  RR: 20 (04-23-21 @ 16:32) (16 - 20)  SpO2: 98% (04-23-21 @ 16:32) (97% - 99%)  Wt(kg): --  Tmax: T(C): , Max: 37.5 (04-23-21 @ 11:58)  Wt(kg): --    Gen: NAD  HEENT: normocephalic, atraumatic, no scleral icterus  CV: S1, S2, RRR  Pulm: CTA B/L  Abd: Soft, ND, NTP, no rebound, no guarding, no palpable organomegaly/masses  Rectal: No gross blodd, small melanotic stool in vault, no palpable hemorrhoids  Ext: warm, no edema, palp dp/pt      Labs:                        14.1   12.77 )-----------( 357      ( 23 Apr 2021 12:47 )             44.0     04-23    137  |  104  |  35<H>  ----------------------------<  104<H>  4.4   |  22  |  0.80    Ca    10.4      23 Apr 2021 12:48  Mg     2.1     04-23    TPro  8.1  /  Alb  4.9  /  TBili  0.3  /  DBili  x   /  AST  25  /  ALT  62<H>  /  AlkPhos  70  04-23    PT/INR - ( 23 Apr 2021 12:47 )   PT: 12.8 sec;   INR: 1.07 ratio         PTT - ( 23 Apr 2021 12:47 )  PTT:24.0 sec

## 2021-04-24 LAB
A1C WITH ESTIMATED AVERAGE GLUCOSE RESULT: 5.2 % — SIGNIFICANT CHANGE UP (ref 4–5.6)
ALBUMIN SERPL ELPH-MCNC: 3.8 G/DL — SIGNIFICANT CHANGE UP (ref 3.3–5)
ALP SERPL-CCNC: 51 U/L — SIGNIFICANT CHANGE UP (ref 40–120)
ALT FLD-CCNC: 39 U/L — SIGNIFICANT CHANGE UP (ref 10–45)
ANION GAP SERPL CALC-SCNC: 7 MMOL/L — SIGNIFICANT CHANGE UP (ref 5–17)
AST SERPL-CCNC: 17 U/L — SIGNIFICANT CHANGE UP (ref 10–40)
BASOPHILS # BLD AUTO: 0.06 K/UL — SIGNIFICANT CHANGE UP (ref 0–0.2)
BASOPHILS NFR BLD AUTO: 0.5 % — SIGNIFICANT CHANGE UP (ref 0–2)
BILIRUB SERPL-MCNC: 0.4 MG/DL — SIGNIFICANT CHANGE UP (ref 0.2–1.2)
BUN SERPL-MCNC: 18 MG/DL — SIGNIFICANT CHANGE UP (ref 7–23)
CALCIUM SERPL-MCNC: 9.1 MG/DL — SIGNIFICANT CHANGE UP (ref 8.4–10.5)
CHLORIDE SERPL-SCNC: 106 MMOL/L — SIGNIFICANT CHANGE UP (ref 96–108)
CHOLEST SERPL-MCNC: 119 MG/DL — SIGNIFICANT CHANGE UP
CO2 SERPL-SCNC: 25 MMOL/L — SIGNIFICANT CHANGE UP (ref 22–31)
COVID-19 SPIKE DOMAIN AB INTERP: POSITIVE
COVID-19 SPIKE DOMAIN ANTIBODY RESULT: >250 U/ML — HIGH
CREAT SERPL-MCNC: 0.77 MG/DL — SIGNIFICANT CHANGE UP (ref 0.5–1.3)
EOSINOPHIL # BLD AUTO: 0.09 K/UL — SIGNIFICANT CHANGE UP (ref 0–0.5)
EOSINOPHIL NFR BLD AUTO: 0.7 % — SIGNIFICANT CHANGE UP (ref 0–6)
ESTIMATED AVERAGE GLUCOSE: 103 MG/DL — SIGNIFICANT CHANGE UP (ref 68–114)
GLUCOSE SERPL-MCNC: 83 MG/DL — SIGNIFICANT CHANGE UP (ref 70–99)
HCT VFR BLD CALC: 30.4 % — LOW (ref 39–50)
HCT VFR BLD CALC: 32.9 % — LOW (ref 39–50)
HDLC SERPL-MCNC: 27 MG/DL — LOW
HGB BLD-MCNC: 10 G/DL — LOW (ref 13–17)
HGB BLD-MCNC: 10.7 G/DL — LOW (ref 13–17)
IMM GRANULOCYTES NFR BLD AUTO: 0.4 % — SIGNIFICANT CHANGE UP (ref 0–1.5)
LIPID PNL WITH DIRECT LDL SERPL: 65 MG/DL — SIGNIFICANT CHANGE UP
LYMPHOCYTES # BLD AUTO: 28 % — SIGNIFICANT CHANGE UP (ref 13–44)
LYMPHOCYTES # BLD AUTO: 3.37 K/UL — HIGH (ref 1–3.3)
MCHC RBC-ENTMCNC: 27.7 PG — SIGNIFICANT CHANGE UP (ref 27–34)
MCHC RBC-ENTMCNC: 27.8 PG — SIGNIFICANT CHANGE UP (ref 27–34)
MCHC RBC-ENTMCNC: 32.5 GM/DL — SIGNIFICANT CHANGE UP (ref 32–36)
MCHC RBC-ENTMCNC: 32.9 GM/DL — SIGNIFICANT CHANGE UP (ref 32–36)
MCV RBC AUTO: 84.2 FL — SIGNIFICANT CHANGE UP (ref 80–100)
MCV RBC AUTO: 85.5 FL — SIGNIFICANT CHANGE UP (ref 80–100)
MONOCYTES # BLD AUTO: 0.82 K/UL — SIGNIFICANT CHANGE UP (ref 0–0.9)
MONOCYTES NFR BLD AUTO: 6.8 % — SIGNIFICANT CHANGE UP (ref 2–14)
NEUTROPHILS # BLD AUTO: 7.66 K/UL — HIGH (ref 1.8–7.4)
NEUTROPHILS NFR BLD AUTO: 63.6 % — SIGNIFICANT CHANGE UP (ref 43–77)
NON HDL CHOLESTEROL: 92 MG/DL — SIGNIFICANT CHANGE UP
NRBC # BLD: 0 /100 WBCS — SIGNIFICANT CHANGE UP (ref 0–0)
NRBC # BLD: 0 /100 WBCS — SIGNIFICANT CHANGE UP (ref 0–0)
PLATELET # BLD AUTO: 292 K/UL — SIGNIFICANT CHANGE UP (ref 150–400)
PLATELET # BLD AUTO: 318 K/UL — SIGNIFICANT CHANGE UP (ref 150–400)
POTASSIUM SERPL-MCNC: 3.7 MMOL/L — SIGNIFICANT CHANGE UP (ref 3.5–5.3)
POTASSIUM SERPL-SCNC: 3.7 MMOL/L — SIGNIFICANT CHANGE UP (ref 3.5–5.3)
PROT SERPL-MCNC: 6.2 G/DL — SIGNIFICANT CHANGE UP (ref 6–8.3)
RBC # BLD: 3.61 M/UL — LOW (ref 4.2–5.8)
RBC # BLD: 3.85 M/UL — LOW (ref 4.2–5.8)
RBC # FLD: 12.8 % — SIGNIFICANT CHANGE UP (ref 10.3–14.5)
RBC # FLD: 12.9 % — SIGNIFICANT CHANGE UP (ref 10.3–14.5)
SARS-COV-2 IGG+IGM SERPL QL IA: >250 U/ML — HIGH
SARS-COV-2 IGG+IGM SERPL QL IA: POSITIVE
SODIUM SERPL-SCNC: 138 MMOL/L — SIGNIFICANT CHANGE UP (ref 135–145)
TRIGL SERPL-MCNC: 133 MG/DL — SIGNIFICANT CHANGE UP
TSH SERPL-MCNC: 5.22 UIU/ML — HIGH (ref 0.27–4.2)
WBC # BLD: 10.31 K/UL — SIGNIFICANT CHANGE UP (ref 3.8–10.5)
WBC # BLD: 12.05 K/UL — HIGH (ref 3.8–10.5)
WBC # FLD AUTO: 10.31 K/UL — SIGNIFICANT CHANGE UP (ref 3.8–10.5)
WBC # FLD AUTO: 12.05 K/UL — HIGH (ref 3.8–10.5)

## 2021-04-24 RX ADMIN — SODIUM CHLORIDE 150 MILLILITER(S): 9 INJECTION, SOLUTION INTRAVENOUS at 11:38

## 2021-04-24 RX ADMIN — PANTOPRAZOLE SODIUM 10 MG/HR: 20 TABLET, DELAYED RELEASE ORAL at 11:38

## 2021-04-24 NOTE — PROGRESS NOTE ADULT - SUBJECTIVE AND OBJECTIVE BOX
Chief Complaint:  Patient is a 30y old  Male who presents with a chief complaint of GIB (24 Apr 2021 09:45)      Date of service 04-24-21 @ 14:31      Interval Events: no melena    Hospital Medications:  lactated ringers. 1000 milliLiter(s) IV Continuous <Continuous>  pantoprazole Infusion 8 mG/Hr IV Continuous <Continuous>        Review of Systems:  General:  No wt loss, fevers, chills, night sweats, fatigue,   Eyes:  Good vision, no reported pain  ENT:  No sore throat, pain, runny nose, dysphagia  CV:  No pain, palpitations, hypo/hypertension  Resp:  No dyspnea, cough, tachypnea, wheezing  GI:  See HPI  :  No pain, bleeding, incontinence, nocturia  Muscle:  No pain, weakness  Neuro:  No weakness, tingling, memory problems  Psych:  No fatigue, insomnia, mood problems, depression  Endocrine:  No polyuria, polydipsia, cold/heat intolerance  Heme:  No petechiae, ecchymosis, easy bruisability  Integumentary:  No rash, edema    PHYSICAL EXAM:   Vital Signs:  Vital Signs Last 24 Hrs  T(C): 36.7 (24 Apr 2021 10:41), Max: 36.7 (23 Apr 2021 19:48)  T(F): 98 (24 Apr 2021 10:41), Max: 98.1 (23 Apr 2021 19:48)  HR: 70 (24 Apr 2021 10:41) (70 - 123)  BP: 137/77 (24 Apr 2021 10:41) (81/46 - 148/98)  BP(mean): 111 (23 Apr 2021 18:17) (106 - 111)  RR: 18 (24 Apr 2021 10:41) (12 - 20)  SpO2: 97% (24 Apr 2021 10:41) (95% - 100%)  Daily Height in cm: 170.18 (23 Apr 2021 18:17)    Daily       PHYSICAL EXAM:     GENERAL:  Appears stated age, well-groomed, well-nourished, no distress  HEENT:  NC/AT,  conjunctivae anicteric, clear and pink,   NECK: supple, trachea midline  CHEST:  Full & symmetric excursion, no increased effort, breath sounds clear  HEART:  Regular rhythm, no JVD  ABDOMEN:  Soft, non-tender, non-distended, normoactive bowel sounds,  no masses , no hepatosplenomegaly  EXTREMITIES:  no cyanosis,clubbing or edema  SKIN:  No rash, erythema, or, ecchymoses, no jaundice  NEURO:  Alert, non-focal, no asterixis  PSYCH: Appropriate affect, oriented to place and time  RECTAL: Deferred      LABS Personally reviewed by me:                        10.7   12.05 )-----------( 318      ( 24 Apr 2021 06:35 )             32.9     Mean Cell Volume: 85.5 fl (04-24-21 @ 06:35)    04-24    138  |  106  |  18  ----------------------------<  83  3.7   |  25  |  0.77    Ca    9.1      24 Apr 2021 06:47  Mg     2.1     04-23    TPro  6.2  /  Alb  3.8  /  TBili  0.4  /  DBili  x   /  AST  17  /  ALT  39  /  AlkPhos  51  04-24    LIVER FUNCTIONS - ( 24 Apr 2021 06:47 )  Alb: 3.8 g/dL / Pro: 6.2 g/dL / ALK PHOS: 51 U/L / ALT: 39 U/L / AST: 17 U/L / GGT: x           PT/INR - ( 23 Apr 2021 12:47 )   PT: 12.8 sec;   INR: 1.07 ratio         PTT - ( 23 Apr 2021 12:47 )  PTT:24.0 sec                            10.7   12.05 )-----------( 318      ( 24 Apr 2021 06:35 )             32.9                         11.2   17.95 )-----------( 320      ( 23 Apr 2021 23:49 )             33.7                         14.1   12.77 )-----------( 357      ( 23 Apr 2021 12:47 )             44.0       Imaging personally reviewed by me:

## 2021-04-24 NOTE — PROGRESS NOTE ADULT - SUBJECTIVE AND OBJECTIVE BOX
Name of Patient : RED BECKFORD  MRN: [id number]  DATE OF SERVICE: 04-24-21     Subjective: Patient seen and examined. No new events except as noted.   doing well  S/P EGD, bleeding ulcer fixed  cont PPI drip  diet as tolerated   no bowel movement     REVIEW OF SYSTEMS:    CONSTITUTIONAL: No weakness, fevers or chills  EYES/ENT: No visual changes;  No vertigo or throat pain   NECK: No pain or stiffness  RESPIRATORY: No cough, wheezing, hemoptysis; No shortness of breath  CARDIOVASCULAR: No chest pain or palpitations  GASTROINTESTINAL: No abdominal or epigastric pain. No nausea, vomiting, or hematemesis; No diarrhea or constipation. No melena or hematochezia.  GENITOURINARY: No dysuria, frequency or hematuria  NEUROLOGICAL: No numbness or weakness  SKIN: No itching, burning, rashes, or lesions   All other review of systems is negative unless indicated above.    MEDICATIONS:  MEDICATIONS  (STANDING):  lactated ringers. 1000 milliLiter(s) (150 mL/Hr) IV Continuous <Continuous>  pantoprazole Infusion 8 mG/Hr (10 mL/Hr) IV Continuous <Continuous>      PHYSICAL EXAM:  T(C): 36.4 (04-24-21 @ 20:57), Max: 36.7 (04-24-21 @ 10:41)  HR: 75 (04-24-21 @ 20:57) (70 - 79)  BP: 131/78 (04-24-21 @ 20:57) (111/69 - 137/77)  RR: 18 (04-24-21 @ 20:57) (18 - 18)  SpO2: 96% (04-24-21 @ 20:57) (96% - 98%)  Wt(kg): --  I&O's Summary    24 Apr 2021 07:01  -  24 Apr 2021 23:39  --------------------------------------------------------  IN: 1080 mL / OUT: 0 mL / NET: 1080 mL          Appearance: Normal	  HEENT:  PERRLA   Lymphatic: No lymphadenopathy   Cardiovascular: Normal S1 S2, no JVD  Respiratory: normal effort , clear  Gastrointestinal:  Soft, Non-tender  Skin: No rashes,  warm to touch  Psychiatry:  Mood & affect appropriate  Musculuskeletal: No edema      All labs, Imaging and EKGs personally reviewed         04-24-21 @ 07:01  -  04-24-21 @ 23:39  --------------------------------------------------------  IN: 1080 mL / OUT: 0 mL / NET: 1080 mL                            10.0   10.31 )-----------( 292      ( 24 Apr 2021 18:39 )             30.4               04-24    138  |  106  |  18  ----------------------------<  83  3.7   |  25  |  0.77    Ca    9.1      24 Apr 2021 06:47  Mg     2.1     04-23    TPro  6.2  /  Alb  3.8  /  TBili  0.4  /  DBili  x   /  AST  17  /  ALT  39  /  AlkPhos  51  04-24    PT/INR - ( 23 Apr 2021 12:47 )   PT: 12.8 sec;   INR: 1.07 ratio         PTT - ( 23 Apr 2021 12:47 )  PTT:24.0 sec                   < from: Upper Endoscopy (04.23.21 @ 17:48) >  Findings:       The examined esophagus was normal.       A few erosions were found in the gastric antrum. Biopsies were taken with a cold forceps for        Helicobacter pylori testing from the gastric body       The exam of the stomach was otherwise normal.       One cratered duodenal ulcer with a visible vessel was found in the duodenal bulb. The        surrounding area was inflamed. Area was successfully injected with 3 mL of a 1:10,000   solution of epinephrine. Coagulation using bipolar probe was successful at obliterating the        vessel. There was also a small erosion in the bulb.       The exam of the duodenum was otherwise normal.                                                          Impression:          - Upper GI bleed due to duodenal ulcer with a visible vessel status post                        bimodal treatment with epinephrine and bipolar cautery.  Recommendation:- [facility] to [facility].                       - Continue PPI infusion                       - Clear liquid diet                       - Avoid NSAIDs                       - CBC Q12

## 2021-04-24 NOTE — PROGRESS NOTE ADULT - ATTENDING COMMENTS
Pt care and plan discussed and reviewed with NP. Plan as outlined above edited by me to reflect our discussion. Thirty five minutes spent on total encounter, of which more than fifty percent of the encounter was spent on counseling and/or coordinating care by the attending physician.

## 2021-04-24 NOTE — PROGRESS NOTE ADULT - SUBJECTIVE AND OBJECTIVE BOX
Patient is a 30y old  Male who presents with a chief complaint of GIB (23 Apr 2021 16:33)      INTERVAL HISTORY: feels better     TELEMETRY Personally reviewed: SB 50  -100     REVIEW OF SYSTEMS:   CONSTITUTIONAL: No weakness  EYES/ENT: No visual changes; No throat pain  Neck: No pain or stiffness  Respiratory: No cough, wheezing, No shortness of breath  CARDIOVASCULAR: no chest pain or palpitations  GASTROINTESTINAL: No abdominal pain, no nausea, vomiting or hematemesis  GENITOURINARY: No dysuria, frequency or hematuria  NEUROLOGICAL: No stroke like symptoms  SKIN: No rashes    	  MEDICATIONS:        PHYSICAL EXAM:  T(C): 36.4 (04-24-21 @ 04:18), Max: 37.5 (04-23-21 @ 11:58)  HR: 71 (04-24-21 @ 04:18) (71 - 123)  BP: 111/69 (04-24-21 @ 04:18) (81/46 - 148/98)  RR: 18 (04-24-21 @ 04:18) (12 - 20)  SpO2: 98% (04-24-21 @ 04:18) (95% - 100%)  Wt(kg): --  I&O's Summary    24 Apr 2021 07:01  -  24 Apr 2021 09:45  --------------------------------------------------------  IN: 360 mL / OUT: 0 mL / NET: 360 mL      Height (cm): 170.2 (04-23 @ 18:17)  Weight (kg): 91.6 (04-23 @ 18:17)  BMI (kg/m2): 31.6 (04-23 @ 18:17)  BSA (m2): 2.03 (04-23 @ 18:17)    Appearance: In no distress	  HEENT:    PERRL, EOMI	  Cardiovascular:  S1 S2, No JVD  Respiratory: Lungs clear to auscultation	  Gastrointestinal:  Soft, Non-tender, + BS	  Vascularature:  No edema of LE  Psychiatric: Appropriate affect   Neuro: no acute focal deficits                        10.7   12.05 )-----------( 318      ( 24 Apr 2021 06:35 )             32.9     04-24    138  |  106  |  18  ----------------------------<  83  3.7   |  25  |  0.77    Ca    9.1      24 Apr 2021 06:47  Mg     2.1     04-23    TPro  6.2  /  Alb  3.8  /  TBili  0.4  /  DBili  x   /  AST  17  /  ALT  39  /  AlkPhos  51  04-24    Labs personally reviewed    ASSESSMENT/PLAN: 	  Patient is a 29 Y/O Male with no pertinent PMH p/w lightheadedness starting this AM. Pt reports going to urinate this AM and starting to feel lightheaded. Denies chest pain, sob, palpitations. The symptoms improved after a few seconds of sitting down. He took a nap and when he awoke he has mild epigastric discomfort and urge to have a BM. Reports two dark colored BM's without obvious blood.     Problem/Plan - 1:  ·  Problem: Near syncope, while urinating, likely vasovagal  monitor vital   workup for GIB   EKG: NSR        Problem/Plan - 2:  ·  Problem: GIB (gastrointestinal bleeding).  Plan: Acute onset black stool and abdominal pain  protonix drip   s/p EGD with duodenal ulcer--> treated   tolerating regular diet   continue to trend h/h     Problem/Plan - 3:  ·  Problem: Prophylactic measure.  Plan: DVT and GI PPX       Michelle Samuels DO [facility]  Cardiovascular Medicine  [address] [place] [address], [address]  Office: [phone]  Cell: [phone] Patient is a 30y old  Male who presents with a chief complaint of GIB (23 Apr 2021 16:33)      INTERVAL HISTORY: feels better     TELEMETRY Personally reviewed: SB 50  -100     REVIEW OF SYSTEMS:   CONSTITUTIONAL: No weakness  EYES/ENT: No visual changes; No throat pain  Neck: No pain or stiffness  Respiratory: No cough, wheezing, No shortness of breath  CARDIOVASCULAR: no chest pain or palpitations  GASTROINTESTINAL: No abdominal pain, no nausea, vomiting or hematemesis  GENITOURINARY: No dysuria, frequency or hematuria  NEUROLOGICAL: No stroke like symptoms  SKIN: No rashes    	  MEDICATIONS:        PHYSICAL EXAM:  T(C): 36.4 (04-24-21 @ 04:18), Max: 37.5 (04-23-21 @ 11:58)  HR: 71 (04-24-21 @ 04:18) (71 - 123)  BP: 111/69 (04-24-21 @ 04:18) (81/46 - 148/98)  RR: 18 (04-24-21 @ 04:18) (12 - 20)  SpO2: 98% (04-24-21 @ 04:18) (95% - 100%)  Wt(kg): --  I&O's Summary    24 Apr 2021 07:01  -  24 Apr 2021 09:45  --------------------------------------------------------  IN: 360 mL / OUT: 0 mL / NET: 360 mL      Height (cm): 170.2 (04-23 @ 18:17)  Weight (kg): 91.6 (04-23 @ 18:17)  BMI (kg/m2): 31.6 (04-23 @ 18:17)  BSA (m2): 2.03 (04-23 @ 18:17)    Appearance: In no distress	  HEENT:    PERRL, EOMI	  Cardiovascular:  S1 S2, No JVD  Respiratory: Lungs clear to auscultation	  Gastrointestinal:  Soft, Non-tender, + BS	  Vascularature:  No edema of LE  Psychiatric: Appropriate affect   Neuro: no acute focal deficits                        10.7   12.05 )-----------( 318      ( 24 Apr 2021 06:35 )             32.9     04-24    138  |  106  |  18  ----------------------------<  83  3.7   |  25  |  0.77    Ca    9.1      24 Apr 2021 06:47  Mg     2.1     04-23    TPro  6.2  /  Alb  3.8  /  TBili  0.4  /  DBili  x   /  AST  17  /  ALT  39  /  AlkPhos  51  04-24    Labs personally reviewed    ASSESSMENT/PLAN: 	  Patient is a 31 Y/O Male with no pertinent PMH p/w lightheadedness starting this AM. Pt reports going to urinate this AM and starting to feel lightheaded. Denies chest pain, sob, palpitations. The symptoms improved after a few seconds of sitting down. He took a nap and when he awoke he has mild epigastric discomfort and urge to have a BM. Reports two dark colored BM's without obvious blood.     Problem/Plan - 1:  ·  Problem: Near syncope, while urinating, likely vasovagal in setting of GIB  monitor vital   Tele with no events  Treatment of GIB  Oupt follow up with PCP/Cards Dr Friedman for echo  EKG: NSR        Problem/Plan - 2:  ·  Problem: GIB (gastrointestinal bleeding).  Plan: Acute onset black stool and abdominal pain  protonix drip   s/p EGD with duodenal ulcer--> treated   tolerating regular diet   continue to trend h/h       Problem/Plan - 3:  ·  Problem: Prophylactic measure.  Plan: DVT and GI PPX       Oupt follow up with PCP/Cards Dr Elder Samuels DO [facility]  Cardiovascular Medicine  [address] [place] [address], [address]  Office: [phone]  Cell: [phone]

## 2021-04-25 ENCOUNTER — TRANSCRIPTION ENCOUNTER (OUTPATIENT)
Age: 31
End: 2021-04-25

## 2021-04-25 VITALS
RESPIRATION RATE: 18 BRPM | SYSTOLIC BLOOD PRESSURE: 137 MMHG | OXYGEN SATURATION: 95 % | DIASTOLIC BLOOD PRESSURE: 91 MMHG | HEART RATE: 82 BPM | TEMPERATURE: 98 F

## 2021-04-25 LAB
ANION GAP SERPL CALC-SCNC: 10 MMOL/L — SIGNIFICANT CHANGE UP (ref 5–17)
BUN SERPL-MCNC: 12 MG/DL — SIGNIFICANT CHANGE UP (ref 7–23)
CALCIUM SERPL-MCNC: 8.7 MG/DL — SIGNIFICANT CHANGE UP (ref 8.4–10.5)
CHLORIDE SERPL-SCNC: 110 MMOL/L — HIGH (ref 96–108)
CO2 SERPL-SCNC: 22 MMOL/L — SIGNIFICANT CHANGE UP (ref 22–31)
CREAT SERPL-MCNC: 0.82 MG/DL — SIGNIFICANT CHANGE UP (ref 0.5–1.3)
GLUCOSE SERPL-MCNC: 92 MG/DL — SIGNIFICANT CHANGE UP (ref 70–99)
HCT VFR BLD CALC: 30.2 % — LOW (ref 39–50)
HGB BLD-MCNC: 9.9 G/DL — LOW (ref 13–17)
MCHC RBC-ENTMCNC: 28 PG — SIGNIFICANT CHANGE UP (ref 27–34)
MCHC RBC-ENTMCNC: 32.8 GM/DL — SIGNIFICANT CHANGE UP (ref 32–36)
MCV RBC AUTO: 85.3 FL — SIGNIFICANT CHANGE UP (ref 80–100)
NRBC # BLD: 0 /100 WBCS — SIGNIFICANT CHANGE UP (ref 0–0)
PLATELET # BLD AUTO: 294 K/UL — SIGNIFICANT CHANGE UP (ref 150–400)
POTASSIUM SERPL-MCNC: 3.6 MMOL/L — SIGNIFICANT CHANGE UP (ref 3.5–5.3)
POTASSIUM SERPL-SCNC: 3.6 MMOL/L — SIGNIFICANT CHANGE UP (ref 3.5–5.3)
RBC # BLD: 3.54 M/UL — LOW (ref 4.2–5.8)
RBC # FLD: 13 % — SIGNIFICANT CHANGE UP (ref 10.3–14.5)
SODIUM SERPL-SCNC: 142 MMOL/L — SIGNIFICANT CHANGE UP (ref 135–145)
WBC # BLD: 9.24 K/UL — SIGNIFICANT CHANGE UP (ref 3.8–10.5)
WBC # FLD AUTO: 9.24 K/UL — SIGNIFICANT CHANGE UP (ref 3.8–10.5)

## 2021-04-25 PROCEDURE — 83036 HEMOGLOBIN GLYCOSYLATED A1C: CPT

## 2021-04-25 PROCEDURE — 85025 COMPLETE CBC W/AUTO DIFF WBC: CPT

## 2021-04-25 PROCEDURE — 85018 HEMOGLOBIN: CPT

## 2021-04-25 PROCEDURE — 82947 ASSAY GLUCOSE BLOOD QUANT: CPT

## 2021-04-25 PROCEDURE — 85045 AUTOMATED RETICULOCYTE COUNT: CPT

## 2021-04-25 PROCEDURE — 85027 COMPLETE CBC AUTOMATED: CPT

## 2021-04-25 PROCEDURE — 84132 ASSAY OF SERUM POTASSIUM: CPT

## 2021-04-25 PROCEDURE — 85014 HEMATOCRIT: CPT

## 2021-04-25 PROCEDURE — U0005: CPT

## 2021-04-25 PROCEDURE — 80061 LIPID PANEL: CPT

## 2021-04-25 PROCEDURE — 83605 ASSAY OF LACTIC ACID: CPT

## 2021-04-25 PROCEDURE — 88305 TISSUE EXAM BY PATHOLOGIST: CPT

## 2021-04-25 PROCEDURE — 86769 SARS-COV-2 COVID-19 ANTIBODY: CPT

## 2021-04-25 PROCEDURE — 84443 ASSAY THYROID STIM HORMONE: CPT

## 2021-04-25 PROCEDURE — 84295 ASSAY OF SERUM SODIUM: CPT

## 2021-04-25 PROCEDURE — U0003: CPT

## 2021-04-25 PROCEDURE — 85610 PROTHROMBIN TIME: CPT

## 2021-04-25 PROCEDURE — 80053 COMPREHEN METABOLIC PANEL: CPT

## 2021-04-25 PROCEDURE — 96374 THER/PROPH/DIAG INJ IV PUSH: CPT

## 2021-04-25 PROCEDURE — 85730 THROMBOPLASTIN TIME PARTIAL: CPT

## 2021-04-25 PROCEDURE — 82435 ASSAY OF BLOOD CHLORIDE: CPT

## 2021-04-25 PROCEDURE — 82330 ASSAY OF CALCIUM: CPT

## 2021-04-25 PROCEDURE — 83880 ASSAY OF NATRIURETIC PEPTIDE: CPT

## 2021-04-25 PROCEDURE — 82803 BLOOD GASES ANY COMBINATION: CPT

## 2021-04-25 PROCEDURE — 84484 ASSAY OF TROPONIN QUANT: CPT

## 2021-04-25 PROCEDURE — 83735 ASSAY OF MAGNESIUM: CPT

## 2021-04-25 PROCEDURE — 80048 BASIC METABOLIC PNL TOTAL CA: CPT

## 2021-04-25 PROCEDURE — 71046 X-RAY EXAM CHEST 2 VIEWS: CPT

## 2021-04-25 PROCEDURE — 99285 EMERGENCY DEPT VISIT HI MDM: CPT

## 2021-04-25 RX ORDER — PANTOPRAZOLE SODIUM 20 MG/1
1 TABLET, DELAYED RELEASE ORAL
Qty: 60 | Refills: 0
Start: 2021-04-25 | End: 2021-05-24

## 2021-04-25 RX ORDER — PANTOPRAZOLE SODIUM 20 MG/1
1 TABLET, DELAYED RELEASE ORAL
Qty: 90 | Refills: 0
Start: 2021-04-25 | End: 2021-05-24

## 2021-04-25 NOTE — PROGRESS NOTE ADULT - SUBJECTIVE AND OBJECTIVE BOX
DATE OF SERVICE: 04-25-21 @ 21:29    Patient is a 30y old  Male who presents with a chief complaint of GIB (25 Apr 2021 15:01)      INTERVAL HISTORY: feels ok    REVIEW OF SYSTEMS:  CONSTITUTIONAL: No weakness  EYES/ENT: No visual changes;  No throat pain   NECK: No pain or stiffness  RESPIRATORY: No cough, wheezing; No shortness of breath  CARDIOVASCULAR: No chest pain or palpitations  GASTROINTESTINAL: No abdominal  pain. No nausea, vomiting, or hematemesis  GENITOURINARY: No dysuria, frequency or hematuria  NEUROLOGICAL: No stroke like symptoms  SKIN: No rashes      TELEMETRY Personally reviewed: NSR, no events  	    PHYSICAL EXAM:  T(C): 36.6 (04-25-21 @ 10:52), Max: 36.6 (04-25-21 @ 05:07)  HR: 82 (04-25-21 @ 10:52) (75 - 82)  BP: 137/91 (04-25-21 @ 10:52) (104/63 - 137/91)  RR: 18 (04-25-21 @ 10:52) (18 - 18)  SpO2: 95% (04-25-21 @ 10:52) (95% - 96%)  Wt(kg): --  I&O's Summary    24 Apr 2021 07:01  -  25 Apr 2021 07:00  --------------------------------------------------------  IN: 1080 mL / OUT: 0 mL / NET: 1080 mL    25 Apr 2021 07:01  -  25 Apr 2021 21:29  --------------------------------------------------------  IN: 1620 mL / OUT: 0 mL / NET: 1620 mL          Appearance: In no distress	  HEENT:    PERRL, EOMI	  Cardiovascular:  S1 S2, No JVD  Respiratory: Lungs clear to auscultation	  Gastrointestinal:  Soft, Non-tender, + BS	  Vascularature:  No edema of LE  Psychiatric: Appropriate affect   Neuro: no acute focal deficits                               9.9    9.24  )-----------( 294      ( 25 Apr 2021 06:19 )             30.2     04-25    142  |  110<H>  |  12  ----------------------------<  92  3.6   |  22  |  0.82    Ca    8.7      25 Apr 2021 06:19    TPro  6.2  /  Alb  3.8  /  TBili  0.4  /  DBili  x   /  AST  17  /  ALT  39  /  AlkPhos  51  04-24        Labs personally reviewed      ASSESSMENT/PLAN: 	  Patient is a 29 Y/O Male with no pertinent PMH p/w lightheadedness starting this AM. Pt reports going to urinate this AM and starting to feel lightheaded. Denies chest pain, sob, palpitations. The symptoms improved after a few seconds of sitting down. He took a nap and when he awoke he has mild epigastric discomfort and urge to have a BM. Reports two dark colored BM's without obvious blood.     Problem/Plan - 1:  ·  Problem: Near syncope, while urinating, likely vasovagal in setting of GIB  monitor vital   Tele with no events  Treatment of GIB  Oupt follow up with PCP/Cards Dr Friedman for echo  EKG: NSR        Problem/Plan - 2:  ·  Problem: GIB (gastrointestinal bleeding).  Plan: Acute onset black stool and abdominal pain  protonix drip   s/p EGD with duodenal ulcer--> treated   tolerating regular diet   Hb stable       Problem/Plan - 3:  ·  Problem: Prophylactic measure.  Plan: DVT and GI PPX     Thirty five minutes spent on total encounter, of which more than fifty percent of the encounter was spent on counseling and/or coordinating care by the attending physician.      Oupt follow up with PCP/Cards Dr Elder Samuels DO [facility]  Cardiovascular Medicine  [address], [address]  Office: [phone]  Cell: [phone]

## 2021-04-25 NOTE — PROGRESS NOTE ADULT - SUBJECTIVE AND OBJECTIVE BOX
Name of Patient : RED BECKFORD  MRN: [id number]  DATE OF SERVICE: 04-25-21     Subjective: Patient seen and examined. No new events except as noted.   doing okay  no bleeding  tolerating oral feeding  D/C Planing with outpatient follow up     REVIEW OF SYSTEMS:    CONSTITUTIONAL: No weakness, fevers or chills  EYES/ENT: No visual changes;  No vertigo or throat pain   NECK: No pain or stiffness  RESPIRATORY: No cough, wheezing, hemoptysis; No shortness of breath  CARDIOVASCULAR: No chest pain or palpitations  GASTROINTESTINAL: No abdominal or epigastric pain. No nausea, vomiting, or hematemesis; No diarrhea or constipation. No melena or hematochezia.  GENITOURINARY: No dysuria, frequency or hematuria  NEUROLOGICAL: No numbness or weakness  SKIN: No itching, burning, rashes, or lesions   All other review of systems is negative unless indicated above.    MEDICATIONS:  MEDICATIONS  (STANDING):  pantoprazole Infusion 8 mG/Hr (10 mL/Hr) IV Continuous <Continuous>      PHYSICAL EXAM:  T(C): 36.6 (04-25-21 @ 10:52), Max: 36.6 (04-25-21 @ 05:07)  HR: 82 (04-25-21 @ 10:52) (75 - 82)  BP: 137/91 (04-25-21 @ 10:52) (104/63 - 137/91)  RR: 18 (04-25-21 @ 10:52) (18 - 18)  SpO2: 95% (04-25-21 @ 10:52) (95% - 96%)  Wt(kg): --  I&O's Summary    24 Apr 2021 07:01  -  25 Apr 2021 07:00  --------------------------------------------------------  IN: 1080 mL / OUT: 0 mL / NET: 1080 mL    25 Apr 2021 07:01  -  25 Apr 2021 22:04  --------------------------------------------------------  IN: 1620 mL / OUT: 0 mL / NET: 1620 mL          Appearance: Normal	  HEENT:  PERRLA   Lymphatic: No lymphadenopathy   Cardiovascular: Normal S1 S2, no JVD  Respiratory: normal effort , clear  Gastrointestinal:  Soft, Non-tender  Skin: No rashes,  warm to touch  Psychiatry:  Mood & affect appropriate  Musculuskeletal: No edema      All labs, Imaging and EKGs personally reviewed         04-24-21 @ 07:01  -  04-25-21 @ 07:00  --------------------------------------------------------  IN: 1080 mL / OUT: 0 mL / NET: 1080 mL    04-25-21 @ 07:01  -  04-25-21 @ 22:04  --------------------------------------------------------  IN: 1620 mL / OUT: 0 mL / NET: 1620 mL                            9.9    9.24  )-----------( 294      ( 25 Apr 2021 06:19 )             30.2               04-25    142  |  110<H>  |  12  ----------------------------<  92  3.6   |  22  |  0.82    Ca    8.7      25 Apr 2021 06:19    TPro  6.2  /  Alb  3.8  /  TBili  0.4  /  DBili  x   /  AST  17  /  ALT  39  /  AlkPhos  51  04-24

## 2021-04-25 NOTE — DISCHARGE NOTE PROVIDER - CARE PROVIDER_API CALL
Syed Friedman)  Cardiology; Internal Medicine  [address], [address]  [place] [address]  Phone: [phone]  Fax: [phone]  Follow Up Time: 1 week    Jorge Joe)  Internal Medicine  [address] [place] [address]  Phone: [phone]  Fax: [phone]  Follow Up Time:

## 2021-04-25 NOTE — DISCHARGE NOTE PROVIDER - HOSPITAL COURSE
Patient is a 29 Y/O Male with no pertinent PMH p/w lightheadedness starting this AM. Pt reports going to urinate this AM and starting to feel lightheaded. Denies chest pain, sob, palpitations. The symptoms improved after a few seconds of sitting down. He took a nap and when he awoke he has mild epigastric discomfort and urge to have a BM. Reports two dark colored BMs without obvious blood.    Evaluated by GI, s/p EGD-duodenal ulcer with a visible vessel status post bimodal treatment with epinephrine and bipolar cautery.  s/p PPI gtt and IV hydration. CBC closely monitored   Evaluated by cardiology for syncope. Likely vasovagal. No episode reported on Tele. Echo to be done as outpatient with Dr. Friedman   GI no objection for discharge. Medically cleared for discharge by Dr. Barajas

## 2021-04-25 NOTE — DISCHARGE NOTE PROVIDER - NSDCCPCAREPLAN_GEN_ALL_CORE_FT
PRINCIPAL DISCHARGE DIAGNOSIS  Diagnosis: Near syncope  Assessment and Plan of Treatment: Near syncope, while urinating, likely vasovagal in setting of GIB  You were evaluted by a cardiologist    Monitored on Tele with no events  EKG with NSR  Plase follow up with PCP/Cards Dr Friedman for echo        SECONDARY DISCHARGE DIAGNOSES  Diagnosis: GIB (gastrointestinal bleeding)  Assessment and Plan of Treatment: Evaluated by GI, s/p EGD-duodenal ulcer with a visible vessel status post bimodal treatment with epinephrine and bipolar cautery.  blood count closely monitored - stable  Continue Protonix X2/day for 6 weeks   No not take NSAIDs( such as advil, Ibuprofen, Naproxen etc) for pain  Please follow up with Dr. oJe as outpatient

## 2021-04-25 NOTE — PROGRESS NOTE ADULT - PROBLEM SELECTOR PLAN 2
while urinating  likely vasovagal  monitor vitals  Check EKG   card eval
aerobic capacity/endurance/gait, locomotion, and balance
while urinating  likely vasovagal  monitor vitals  Check EKG   card eval

## 2021-04-25 NOTE — PROGRESS NOTE ADULT - ASSESSMENT
30 year old man presenting with presyncope in the setting of an upper GI bleed     Problem/Recommendation - 1:  Problem: GIB (gastrointestinal bleeding). Recommendation: Due to duodenal ulcer status post cautery for visible vessel. No further melena  -CBC q12h  -continue PPI infusion for total 72h post endoscopy  -advance to regular dliet today     Problem/Recommendation - 2:  ·  Problem: Near syncope.  Recommendation: Suspect due to hypovolemia from GI bleed.  -appreciate cardiology input.      Problem/Recommendation - 3:  ·  Problem: Tachycardia.  Recommendation: Likely due to hypovolemia from GI bleed  -IV fluid bolueses PRN  -transfuse for hgb <8.      Problem/Recommendation - 4:  ·  Problem: Back pain. Recommendation: chronic back pain. takes NSAIDs.  -will give IV tylenol as pt is NPO.     Problem/Recommendation - 5:  ·  Problem: Abnormal BUN-to-creatinine ratio.  Recommendation: Most likely due to upper GI bleed  -s/p treatment of DU     Problem/Recommendation - 6:  ·  Problem: Acute post hemorrrhagic anemia  Recommendation: Most likely due to upper GI bleed. Suspect equilibration from prior bleeding rather than active bleed  -s/p treatment of DU  -CBC BID, transfuse to hgb of 8  -may benefit from PO iron as an outpt        I had a prolonged conversation with the patient regarding the hospital course, differential diagnosis, results of diagnostic testes this far, and therapeutic modalities available. Plan of care discussed with the patient after the evaluation. Patient expresses a clear understanding of the plan of care.  Sixty five minutes spent on the total encounter, of which more than fifty percent of the encounter was spent on counseling and/or coordinating care by the attending physician.        Jorge Joe M.D.   Gastroenterology and Hepatology  [address] [place], [place]  Office: [phone]  Cell: [phone]    
30 year old man presenting with presyncope in the setting of an upper GI bleed     Problem/Recommendation - 1:  Problem: GIB (gastrointestinal bleeding). Recommendation: Due to duodenal ulcer status post cautery for visible vessel. No further melena  -CBC stable, no GI objection to hospital d/c  -total 6 weeks of PPI BID  -no NSAIDs  -will f/u H pylori  -follow up with Dr. Friedman in the office     Problem/Recommendation - 2:  ·  Problem: Near syncope.  Recommendation: Suspect due to hypovolemia from GI bleed.  -appreciate cardiology input.      Problem/Recommendation - 3:  ·  Problem: Tachycardia.  Recommendation: Likely due to hypovolemia from GI bleed  -IV fluid bolueses PRN  -transfuse for hgb <8.      Problem/Recommendation - 4:  ·  Problem: Back pain. Recommendation: chronic back pain. takes NSAIDs.  -will give IV tylenol as pt is NPO.     Problem/Recommendation - 5:  ·  Problem: Abnormal BUN-to-creatinine ratio.  Recommendation: Most likely due to upper GI bleed  -s/p treatment of DU     Problem/Recommendation - 6:  ·  Problem: Acute post hemorrrhagic anemia  Recommendation: Most likely due to upper GI bleed. Suspect equilibration from prior bleeding rather than active bleed  -s/p treatment of DU          I had a prolonged conversation with the patient regarding the hospital course, differential diagnosis, results of diagnostic testes this far, and therapeutic modalities available. Plan of care discussed with the patient after the evaluation. Patient expresses a clear understanding of the plan of care.  Sixty five minutes spent on the total encounter, of which more than fifty percent of the encounter was spent on counseling and/or coordinating care by the attending physician.        Jorge Joe M.D.   Gastroenterology and Hepatology  [address] [place], [place]  Office: [phone]  Cell: [phone]    
Patient is a 31 Y/O Male with no pertinent PMH p/w lightheadedness starting this AM. Pt reports going to urinate this AM and starting to feel lightheaded. Denies chest pain, sob, palpitations. The symptoms improved after a few seconds of sitting down. He took a nap and when he awoke he has mild epigastric discomfort and urge to have a BM. Reports two dark colored BMs without obvious blood.
Patient is a 29 Y/O Male with no pertinent PMH p/w lightheadedness starting this AM. Pt reports going to urinate this AM and starting to feel lightheaded. Denies chest pain, sob, palpitations. The symptoms improved after a few seconds of sitting down. He took a nap and when he awoke he has mild epigastric discomfort and urge to have a BM. Reports two dark colored BMs without obvious blood.

## 2021-04-25 NOTE — PROGRESS NOTE ADULT - PROBLEM SELECTOR PLAN 3
DVT and GI PPX     patient PMD Dr tucker, called [facility] to to take over care however advised to follow patient and for patient to follow up with PMD after discharge    D/C Planing
DVT and GI PPX     patient PMD Dr tucker, called [facility] to to take over care however advised to follow patient and for patient to follow up with PMD after discharge

## 2021-04-25 NOTE — DISCHARGE NOTE NURSING/CASE MANAGEMENT/SOCIAL WORK - PATIENT PORTAL LINK FT
You can access the FollowMyHealth Patient Portal offered by [facility] by registering at the following website: [email or web address]. By joining [facility]’s FollowMyHealth portal, you will also be able to view your health information using other applications (apps) compatible with our system.

## 2021-04-25 NOTE — PROGRESS NOTE ADULT - SUBJECTIVE AND OBJECTIVE BOX
Chief Complaint:  Patient is a 30y old  Male who presents with a chief complaint of GIB (24 Apr 2021 15:38)      Date of service 04-25-21 @ 13:35      Interval Events: no melena    Hospital Medications:  lactated ringers. 1000 milliLiter(s) IV Continuous <Continuous>  pantoprazole Infusion 8 mG/Hr IV Continuous <Continuous>        Review of Systems:  General:  No wt loss, fevers, chills, night sweats, fatigue,   Eyes:  Good vision, no reported pain  ENT:  No sore throat, pain, runny nose, dysphagia  CV:  No pain, palpitations, hypo/hypertension  Resp:  No dyspnea, cough, tachypnea, wheezing  GI:  See HPI  :  No pain, bleeding, incontinence, nocturia  Muscle:  No pain, weakness  Neuro:  No weakness, tingling, memory problems  Psych:  No fatigue, insomnia, mood problems, depression  Endocrine:  No polyuria, polydipsia, cold/heat intolerance  Heme:  No petechiae, ecchymosis, easy bruisability  Integumentary:  No rash, edema    PHYSICAL EXAM:   Vital Signs:  Vital Signs Last 24 Hrs  T(C): 36.6 (25 Apr 2021 10:52), Max: 36.6 (25 Apr 2021 05:07)  T(F): 97.9 (25 Apr 2021 10:52), Max: 97.9 (25 Apr 2021 10:52)  HR: 82 (25 Apr 2021 10:52) (75 - 82)  BP: 137/91 (25 Apr 2021 10:52) (104/63 - 137/91)  BP(mean): --  RR: 18 (25 Apr 2021 10:52) (18 - 18)  SpO2: 95% (25 Apr 2021 10:52) (95% - 96%)  Daily     Daily       PHYSICAL EXAM:     GENERAL:  Appears stated age, well-groomed, well-nourished, no distress  HEENT:  NC/AT,  conjunctivae anicteric, clear and pink,   NECK: supple, trachea midline  CHEST:  Full & symmetric excursion, no increased effort, breath sounds clear  HEART:  Regular rhythm, no JVD  ABDOMEN:  Soft, non-tender, non-distended, normoactive bowel sounds,  no masses , no hepatosplenomegaly  EXTREMITIES:  no cyanosis,clubbing or edema  SKIN:  No rash, erythema, or, ecchymoses, no jaundice  NEURO:  Alert, non-focal, no asterixis  PSYCH: Appropriate affect, oriented to place and time  RECTAL: Deferred      LABS Personally reviewed by me:                        9.9    9.24  )-----------( 294      ( 25 Apr 2021 06:19 )             30.2     Mean Cell Volume: 85.3 fl (04-25-21 @ 06:19)    04-25    142  |  110<H>  |  12  ----------------------------<  92  3.6   |  22  |  0.82    Ca    8.7      25 Apr 2021 06:19    TPro  6.2  /  Alb  3.8  /  TBili  0.4  /  DBili  x   /  AST  17  /  ALT  39  /  AlkPhos  51  04-24    LIVER FUNCTIONS - ( 24 Apr 2021 06:47 )  Alb: 3.8 g/dL / Pro: 6.2 g/dL / ALK PHOS: 51 U/L / ALT: 39 U/L / AST: 17 U/L / GGT: x                                       9.9    9.24  )-----------( 294      ( 25 Apr 2021 06:19 )             30.2                         10.0   10.31 )-----------( 292      ( 24 Apr 2021 18:39 )             30.4                         10.7   12.05 )-----------( 318      ( 24 Apr 2021 06:35 )             32.9                         11.2   17.95 )-----------( 320      ( 23 Apr 2021 23:49 )             33.7                         14.1   12.77 )-----------( 357      ( 23 Apr 2021 12:47 )             44.0       Imaging personally reviewed by me:

## 2021-04-25 NOTE — PROGRESS NOTE ADULT - PROBLEM SELECTOR PLAN 1
acute onset black stool and abdominal pain  GI eval appreciated  S/P EGD< bleeding , stopped, noted above   Cont hydration and protonix drip   serial CBC Q12hrs for now  active T7S, transfuse to keep hgb above 7  cont PPI , oral for total of at least 6 weeks   diet as tolerated
acute onset black stool and abdominal pain  GI eval appreciated  S/P EGD< bleeding , stopped, noted above   Cont hydration and protonix drip   serial CBC Q12hrs for now  active T7S, transfuse to keep hgb above 7  cont PPI for 72 hrs  after EGD   diet as tolerated

## 2021-04-30 ENCOUNTER — NON-APPOINTMENT (OUTPATIENT)
Age: 31
End: 2021-04-30

## 2021-04-30 ENCOUNTER — APPOINTMENT (OUTPATIENT)
Dept: CARDIOLOGY | Facility: CLINIC | Age: 31
End: 2021-04-30
Payer: COMMERCIAL

## 2021-04-30 VITALS
OXYGEN SATURATION: 98 % | WEIGHT: 208 LBS | BODY MASS INDEX: 32.65 KG/M2 | HEART RATE: 77 BPM | SYSTOLIC BLOOD PRESSURE: 120 MMHG | HEIGHT: 67 IN | DIASTOLIC BLOOD PRESSURE: 80 MMHG

## 2021-04-30 DIAGNOSIS — R53.83 OTHER FATIGUE: ICD-10-CM

## 2021-04-30 DIAGNOSIS — R06.83 SNORING: ICD-10-CM

## 2021-04-30 DIAGNOSIS — K27.9 PEPTIC ULCER, SITE UNSPECIFIED, UNSPECIFIED AS ACUTE OR CHRONIC, W/OUT HEMORRHAGE OR PERFORATION: ICD-10-CM

## 2021-04-30 DIAGNOSIS — R31.29 OTHER MICROSCOPIC HEMATURIA: ICD-10-CM

## 2021-04-30 PROCEDURE — 93000 ELECTROCARDIOGRAM COMPLETE: CPT

## 2021-04-30 PROCEDURE — 99214 OFFICE O/P EST MOD 30 MIN: CPT

## 2021-04-30 PROCEDURE — 99072 ADDL SUPL MATRL&STAF TM PHE: CPT

## 2021-04-30 RX ORDER — CYCLOBENZAPRINE HYDROCHLORIDE 5 MG/1
5 TABLET, FILM COATED ORAL
Qty: 60 | Refills: 0 | Status: ACTIVE | COMMUNITY
Start: 2021-04-30 | End: 1900-01-01

## 2021-04-30 RX ORDER — PANTOPRAZOLE SODIUM 40 MG/1
40 TABLET, DELAYED RELEASE ORAL
Refills: 0 | Status: ACTIVE | COMMUNITY
Start: 2021-04-30

## 2021-04-30 NOTE — HISTORY OF PRESENT ILLNESS
[FreeTextEntry1] : pt presents s/p recent er evaluation and hospitalization pt with episode of melena and brbpr .pt with ugi bleed s/p urgent endoscopy same day duodenal ulcer .pt pt cauterized and did well h pylori negative melena resolved pt feels well .pt her efor f/u secondary to above pt denies any chest  pain dizziness ,lightheadedness ,nausea vomiting diaphoresis\par pt with ocassional back pain

## 2021-04-30 NOTE — REVIEW OF SYSTEMS
[Feeling Fatigued] : feeling fatigued [Negative] : Heme/Lymph [FreeTextEntry7] : see hpi  [de-identified] : nevi  [FreeTextEntry9] : back

## 2021-04-30 NOTE — PHYSICAL EXAM
[Well Developed] : well developed [Well Nourished] : well nourished [No Acute Distress] : no acute distress [Normal Conjunctiva] : normal conjunctiva [Normal Venous Pressure] : normal venous pressure [No Carotid Bruit] : no carotid bruit [Normal S1, S2] : normal S1, S2 [No Murmur] : no murmur [No Rub] : no rub [No Gallop] : no gallop [Clear Lung Fields] : clear lung fields [Good Air Entry] : good air entry [No Respiratory Distress] : no respiratory distress  [Soft] : abdomen soft [Non Tender] : non-tender [No Masses/organomegaly] : no masses/organomegaly [Normal Bowel Sounds] : normal bowel sounds [Normal Gait] : normal gait [No Edema] : no edema [No Cyanosis] : no cyanosis [No Clubbing] : no clubbing [No Varicosities] : no varicosities [No Rash] : no rash [No Skin Lesions] : no skin lesions [Moves all extremities] : moves all extremities [No Focal Deficits] : no focal deficits [Normal Speech] : normal speech [Alert and Oriented] : alert and oriented [Normal memory] : normal memory [de-identified] : reproducible pain back area  [de-identified] : nevi

## 2021-05-02 ENCOUNTER — TRANSCRIPTION ENCOUNTER (OUTPATIENT)
Age: 31
End: 2021-05-02

## 2021-06-12 ENCOUNTER — TRANSCRIPTION ENCOUNTER (OUTPATIENT)
Age: 31
End: 2021-06-12

## 2021-08-11 NOTE — PATIENT PROFILE ADULT - HOME ACCESSIBILITY CONCERNS
Initial Independent Living Donor Advocate contact made with potential donor today.  I introduced myself and my role during the donation process, includin.  [place] ROLE   The federal government requires that all licensed transplant centers provide the living donor with an Independent Living Donor Advocate ([place]).  I do not meet recipients or attend meetings that discuss their care or decision to transplant them. My role is separate to avoid any conflict of interest.  My role is to ensure:  1) your rights are protected;  2) you get all the information you need from the transplant team to make a fully informed decision whether to donate;   3) that living donation is in your best interest.   4) that you have the right to decide NOT to go forward with living donation at any time during this process.  I am available to you throughout the workup, during surgery phase and follow-up at home.   2. WORKUP & PRIVACY     Your identity and workup are not shared with the recipient at any time.     There is a medical donor workup that consists of testing to determine if you are healthy enough to donate.  Workup tests include many blood draws, urine collection/ (kidney function testing), chest x-ray, EKG, CT scan. As you complete each step then you may move on to the next.  Workup can take as little or as long as you need and you can stop the process at any time.     Transplant is a treatment option, not a cure. A kidney from a living kidney donor can last 12-14 years.  Other treatment options are  donation and two types of dialysis.     This is major surgery and your estimated hospital stay is approximately 1-2 nights.  After surgery, there are driving and lifting restrictions - no driving for two weeks and no lifting over ten pounds for 6 - 8 weeks.  Donors are routinely off from work for 4 - 6 weeks after surgery, and potentially longer if they have a physical job.       If you anticipate lost wages due to donation,  donor wage reimbursement options may be available to you and will be reviewed with you during the evaluation process.      The recipient's insurance covers the medical expenses related to the donor evaluation and surgery.  However, it is important for you to carry your own health insurance to address any medical issues that are found and are NOT related to living donation.  3.  QUESTIONS  Have you received a packet from the transplant department? She hasn't reviewed the information yet and was asked to please do so.    Questions?    Have you discussed with anyone your potential decision to donate?   Yes, family an friends, who are supportive  Is anyone pressuring or coercing you to donate? no  Have you discussed any financial arrangements with recipient around donating a kidney? no  Are you aware that you can confidentially opt out at any time, up to and including day of donation? yes  At this time, would you like to proceed with the medical evaluation to see if you can be a kidney donor? yes    If yes, the donor coordinator will be reaching out to you with next steps.     You can reach me or someone else on the [place] team by calling [phone] Option 3.    [place] NOTES: She remains interested in pursuing donation. She has not read the information sent to her from our transplant center, and was encouraged to do so.  I will let our nurse, Katya Cruz know that she can move forward with the donor process.    Duration of call 20 minutes     none

## 2021-08-12 LAB
25(OH)D3 SERPL-MCNC: 17.3 NG/ML
25(OH)D3 SERPL-MCNC: 19.6 NG/ML
ALBUMIN SERPL ELPH-MCNC: 4.7 G/DL
ALBUMIN SERPL ELPH-MCNC: 4.8 G/DL
ALP BLD-CCNC: 75 U/L
ALP BLD-CCNC: 84 U/L
ALT SERPL-CCNC: 65 U/L
ALT SERPL-CCNC: 92 U/L
ANION GAP SERPL CALC-SCNC: 13 MMOL/L
ANION GAP SERPL CALC-SCNC: 16 MMOL/L
APPEARANCE: CLEAR
AST SERPL-CCNC: 34 U/L
AST SERPL-CCNC: <5 U/L
BACTERIA: NEGATIVE
BASOPHILS # BLD AUTO: 0.07 K/UL
BASOPHILS # BLD AUTO: 0.11 K/UL
BASOPHILS NFR BLD AUTO: 0.6 %
BASOPHILS NFR BLD AUTO: 1 %
BILIRUB SERPL-MCNC: 0.2 MG/DL
BILIRUB SERPL-MCNC: 0.3 MG/DL
BILIRUBIN URINE: NEGATIVE
BLOOD URINE: ABNORMAL
BUN SERPL-MCNC: 16 MG/DL
BUN SERPL-MCNC: 17 MG/DL
CALCIUM SERPL-MCNC: 10 MG/DL
CALCIUM SERPL-MCNC: 10.1 MG/DL
CHLORIDE SERPL-SCNC: 103 MMOL/L
CHLORIDE SERPL-SCNC: 103 MMOL/L
CHOLEST SERPL-MCNC: 176 MG/DL
CHOLEST SERPL-MCNC: 210 MG/DL
CO2 SERPL-SCNC: 20 MMOL/L
CO2 SERPL-SCNC: 22 MMOL/L
COLOR: NORMAL
CREAT SERPL-MCNC: 0.79 MG/DL
CREAT SERPL-MCNC: 0.81 MG/DL
EOSINOPHIL # BLD AUTO: 0.14 K/UL
EOSINOPHIL # BLD AUTO: 0.42 K/UL
EOSINOPHIL NFR BLD AUTO: 1.3 %
EOSINOPHIL NFR BLD AUTO: 3.8 %
ESTIMATED AVERAGE GLUCOSE: 105 MG/DL
FERRITIN SERPL-MCNC: 66 NG/ML
FOLATE SERPL-MCNC: >20 NG/ML
GLUCOSE QUALITATIVE U: NEGATIVE
GLUCOSE SERPL-MCNC: 106 MG/DL
GLUCOSE SERPL-MCNC: 62 MG/DL
HAPTOGLOB SERPL-MCNC: 179 MG/DL
HBA1C MFR BLD HPLC: 5.3 %
HCT VFR BLD CALC: 36.8 %
HCT VFR BLD CALC: 47.5 %
HDLC SERPL-MCNC: 34 MG/DL
HDLC SERPL-MCNC: 42 MG/DL
HGB BLD-MCNC: 11.7 G/DL
HGB BLD-MCNC: 15.5 G/DL
HYALINE CASTS: 0 /LPF
IMM GRANULOCYTES NFR BLD AUTO: 0.5 %
IMM GRANULOCYTES NFR BLD AUTO: 0.7 %
IRON SERPL-MCNC: 33 UG/DL
KETONES URINE: NORMAL
LDH SERPL-CCNC: 234 U/L
LDLC SERPL CALC-MCNC: 116 MG/DL
LDLC SERPL CALC-MCNC: 138 MG/DL
LDLC SERPL DIRECT ASSAY-MCNC: 112 MG/DL
LEUKOCYTE ESTERASE URINE: NEGATIVE
LYMPHOCYTES # BLD AUTO: 2.23 K/UL
LYMPHOCYTES # BLD AUTO: 2.48 K/UL
LYMPHOCYTES NFR BLD AUTO: 20.1 %
LYMPHOCYTES NFR BLD AUTO: 22.9 %
MAN DIFF?: NORMAL
MAN DIFF?: NORMAL
MCHC RBC-ENTMCNC: 27.5 PG
MCHC RBC-ENTMCNC: 27.7 PG
MCHC RBC-ENTMCNC: 31.8 GM/DL
MCHC RBC-ENTMCNC: 32.6 GM/DL
MCV RBC AUTO: 85 FL
MCV RBC AUTO: 86.4 FL
MICROSCOPIC-UA: NORMAL
MONOCYTES # BLD AUTO: 0.84 K/UL
MONOCYTES # BLD AUTO: 0.9 K/UL
MONOCYTES NFR BLD AUTO: 7.7 %
MONOCYTES NFR BLD AUTO: 8.1 %
NEUTROPHILS # BLD AUTO: 7.27 K/UL
NEUTROPHILS # BLD AUTO: 7.37 K/UL
NEUTROPHILS NFR BLD AUTO: 66.3 %
NEUTROPHILS NFR BLD AUTO: 67 %
NITRITE URINE: NEGATIVE
NONHDLC SERPL-MCNC: 142 MG/DL
NONHDLC SERPL-MCNC: 169 MG/DL
PH URINE: 6
PLATELET # BLD AUTO: 387 K/UL
PLATELET # BLD AUTO: 448 K/UL
POTASSIUM SERPL-SCNC: 3.8 MMOL/L
POTASSIUM SERPL-SCNC: 4.3 MMOL/L
PROT SERPL-MCNC: 7.5 G/DL
PROT SERPL-MCNC: 8.1 G/DL
PROTEIN URINE: NORMAL
RBC # BLD: 4.26 M/UL
RBC # BLD: 5.59 M/UL
RBC # FLD: 13.2 %
RBC # FLD: 13.2 %
RED BLOOD CELLS URINE: 8 /HPF
SODIUM SERPL-SCNC: 137 MMOL/L
SODIUM SERPL-SCNC: 139 MMOL/L
SPECIFIC GRAVITY URINE: 1.03
SQUAMOUS EPITHELIAL CELLS: 0 /HPF
T4 FREE SERPL-MCNC: 1.1 NG/DL
TRANSFERRIN SERPL-MCNC: 308 MG/DL
TRIGL SERPL-MCNC: 131 MG/DL
TRIGL SERPL-MCNC: 155 MG/DL
TSH SERPL-ACNC: 3.3 UIU/ML
UROBILINOGEN URINE: NORMAL
VIT B12 SERPL-MCNC: 758 PG/ML
WBC # FLD AUTO: 10.85 K/UL
WBC # FLD AUTO: 11.11 K/UL
WHITE BLOOD CELLS URINE: 1 /HPF

## 2021-08-18 PROBLEM — Z78.9 OTHER SPECIFIED HEALTH STATUS: Chronic | Status: ACTIVE | Noted: 2021-04-23

## 2021-09-14 ENCOUNTER — APPOINTMENT (OUTPATIENT)
Dept: HUMAN REPRODUCTION | Facility: CLINIC | Age: 31
End: 2021-09-14

## 2022-02-17 ENCOUNTER — TRANSCRIPTION ENCOUNTER (OUTPATIENT)
Age: 32
End: 2022-02-17

## 2022-03-30 ENCOUNTER — NON-APPOINTMENT (OUTPATIENT)
Age: 32
End: 2022-03-30

## 2022-03-30 ENCOUNTER — APPOINTMENT (OUTPATIENT)
Dept: CARDIOLOGY | Facility: CLINIC | Age: 32
End: 2022-03-30
Payer: COMMERCIAL

## 2022-03-30 VITALS
DIASTOLIC BLOOD PRESSURE: 82 MMHG | BODY MASS INDEX: 34.21 KG/M2 | SYSTOLIC BLOOD PRESSURE: 126 MMHG | HEART RATE: 90 BPM | HEIGHT: 67 IN | OXYGEN SATURATION: 98 % | TEMPERATURE: 98.2 F | WEIGHT: 218 LBS

## 2022-03-30 DIAGNOSIS — D22.9 MELANOCYTIC NEVI, UNSPECIFIED: ICD-10-CM

## 2022-03-30 DIAGNOSIS — Z00.00 ENCOUNTER FOR GENERAL ADULT MEDICAL EXAMINATION W/OUT ABNORMAL FINDINGS: ICD-10-CM

## 2022-03-30 DIAGNOSIS — E55.9 VITAMIN D DEFICIENCY, UNSPECIFIED: ICD-10-CM

## 2022-03-30 DIAGNOSIS — Z71.85 ENCOUNTER FOR IMMUNIZATION SAFETY COUNSELING: ICD-10-CM

## 2022-03-30 DIAGNOSIS — Z13.228 ENCOUNTER FOR SCREENING FOR OTHER METABOLIC DISORDERS: ICD-10-CM

## 2022-03-30 DIAGNOSIS — E78.5 HYPERLIPIDEMIA, UNSPECIFIED: ICD-10-CM

## 2022-03-30 PROCEDURE — 99395 PREV VISIT EST AGE 18-39: CPT

## 2022-03-30 PROCEDURE — 93000 ELECTROCARDIOGRAM COMPLETE: CPT

## 2022-03-30 NOTE — PHYSICAL EXAM
[No Acute Distress] : no acute distress [Well Nourished] : well nourished [Well Developed] : well developed [Well-Appearing] : well-appearing [Normal Sclera/Conjunctiva] : normal sclera/conjunctiva [PERRL] : pupils equal round and reactive to light [EOMI] : extraocular movements intact [Normal Outer Ear/Nose] : the outer ears and nose were normal in appearance [Normal Oropharynx] : the oropharynx was normal [No JVD] : no jugular venous distention [No Lymphadenopathy] : no lymphadenopathy [Supple] : supple [Thyroid Normal, No Nodules] : the thyroid was normal and there were no nodules present [No Respiratory Distress] : no respiratory distress  [No Accessory Muscle Use] : no accessory muscle use [Clear to Auscultation] : lungs were clear to auscultation bilaterally [Normal Rate] : normal rate  [Regular Rhythm] : with a regular rhythm [Normal S1, S2] : normal S1 and S2 [No Murmur] : no murmur heard [No Carotid Bruits] : no carotid bruits [No Abdominal Bruit] : a ~M bruit was not heard ~T in the abdomen [Pedal Pulses Present] : the pedal pulses are present [No Varicosities] : no varicosities [No Edema] : there was no peripheral edema [No Palpable Aorta] : no palpable aorta [No Extremity Clubbing/Cyanosis] : no extremity clubbing/cyanosis [Soft] : abdomen soft [Non Tender] : non-tender [Non-distended] : non-distended [No Masses] : no abdominal mass palpated [No HSM] : no HSM [Normal Bowel Sounds] : normal bowel sounds [Normal Posterior Cervical Nodes] : no posterior cervical lymphadenopathy [Normal Anterior Cervical Nodes] : no anterior cervical lymphadenopathy [No CVA Tenderness] : no CVA  tenderness [No Spinal Tenderness] : no spinal tenderness [No Joint Swelling] : no joint swelling [Grossly Normal Strength/Tone] : grossly normal strength/tone [No Rash] : no rash [Coordination Grossly Intact] : coordination grossly intact [No Focal Deficits] : no focal deficits [Normal Gait] : normal gait [Deep Tendon Reflexes (DTR)] : deep tendon reflexes were 2+ and symmetric [Normal Affect] : the affect was normal [Normal Insight/Judgement] : insight and judgment were intact [de-identified] : normal genitals  [de-identified] : nevi

## 2022-03-30 NOTE — HISTORY OF PRESENT ILLNESS
[FreeTextEntry1] : Annual Wellness Visit [de-identified] : This is a 31 year old male who presents today for annual physical exam. He has no acute complaints or concerns at today's visit. He is feeling well overall. Denies chest pain, dyspnea, dizziness, palpations, changes in appetite/bowel habits, nausea, vomiting, abdominal pain.\par

## 2022-05-16 ENCOUNTER — APPOINTMENT (OUTPATIENT)
Dept: DERMATOLOGY | Facility: CLINIC | Age: 32
End: 2022-05-16
Payer: COMMERCIAL

## 2022-05-16 VITALS — WEIGHT: 215 LBS | BODY MASS INDEX: 33.74 KG/M2 | HEIGHT: 67 IN

## 2022-05-16 DIAGNOSIS — L71.8 OTHER ROSACEA: ICD-10-CM

## 2022-05-16 DIAGNOSIS — Z12.83 ENCOUNTER FOR SCREENING FOR MALIGNANT NEOPLASM OF SKIN: ICD-10-CM

## 2022-05-16 DIAGNOSIS — D22.9 MELANOCYTIC NEVI, UNSPECIFIED: ICD-10-CM

## 2022-05-16 PROCEDURE — 99203 OFFICE O/P NEW LOW 30 MIN: CPT

## 2022-06-13 ENCOUNTER — APPOINTMENT (OUTPATIENT)
Dept: DERMATOLOGY | Facility: CLINIC | Age: 32
End: 2022-06-13

## 2022-09-12 ENCOUNTER — APPOINTMENT (OUTPATIENT)
Dept: DERMATOLOGY | Facility: CLINIC | Age: 32
End: 2022-09-12

## 2022-10-12 ENCOUNTER — APPOINTMENT (OUTPATIENT)
Dept: PAIN MANAGEMENT | Facility: CLINIC | Age: 32
End: 2022-10-12

## 2022-10-12 VITALS — HEIGHT: 67 IN | BODY MASS INDEX: 32.96 KG/M2 | WEIGHT: 210 LBS

## 2022-10-12 DIAGNOSIS — M54.9 DORSALGIA, UNSPECIFIED: ICD-10-CM

## 2022-10-12 PROCEDURE — 99214 OFFICE O/P EST MOD 30 MIN: CPT

## 2022-10-12 NOTE — HISTORY OF PRESENT ILLNESS
[Lower back] : lower back [6] : 6 [2] : 2 [Dull/Aching] : dull/aching [Tightness] : tightness [Intermittent] : intermittent [Work] : work [Rest] : rest [Meds] : meds [Ice] : ice [Heat] : heat [Injection therapy] : injection therapy [Exercising] : exercising [FreeTextEntry1] : 10/12/2022: follow up today. Would like to start PT first and then epidural possibly. Pain is currently in the left lower back with intermittent radiation to the hamstring. \par \par 9/8/21- Patient had 80% relief from TFESI. Stopped taking gabapentin. Will hold off on repeat injection. Continue with PT. \par \par 7/2/21: L TFESI with 50% relief of pain improved ROM and ADLS  will repeat for cumulative relief\par \par 5/28/21: Patient states last week pain started without inciting event. Pain is shooting down left thigh buttocks down back of thigh and calf. Has tingling in toes. Saw Dr. Andrew and sent for MRI. Did MDP helped a little. No change in bowel or bladder habits.\par \par MRI: Impression:\par L5-S1: 15 x 10 x 4 mm left central disc herniation causing moderate to severe left spinal canal stenosis. The herniation impinges the traversing left S1 nerve root and the descending nerve roots. Dorsal annular fissure. The herniation is superimposed a disc bulge which causes mild to moderate bilateral neuroforaminal stenosis, greater on the right. Disc space narrowing and disc desiccation. [] : no [FreeTextEntry7] : left side [de-identified] : lifting

## 2022-10-12 NOTE — ASSESSMENT
[FreeTextEntry1] : After discussing various treatment options with the patient including but not limited to oral medications, physical therapy, exercise, modalities as well as interventional spinal injections, we have decided with the following plan:\par \par 1) The patient would benefit from continuation of physical therapy. Short and Long Term goals would be improvement of pain level, improvement of range of motion, improvement of strength and overall improvement of quality of life. \par \par \par Patient has more than a lumbar sprain as evident on his MRI. His symptomatology correlates directly with his MRI which did reveal a disc herniation   at L5/S1 with severe NF stenosis. Pain with flexion and extension.

## 2022-11-20 ENCOUNTER — NON-APPOINTMENT (OUTPATIENT)
Age: 32
End: 2022-11-20

## 2023-02-08 NOTE — PATIENT PROFILE ADULT - NSPRESCRALCSIXMORE_GEN_A_NUR
Never Complex Repair And Xenograft Text: The defect edges were debeveled with a #15 scalpel blade.  The primary defect was closed partially with a complex linear closure.  Given the location of the defect, shape of the defect and the proximity to free margins a xenograft was deemed most appropriate to repair the remaining defect.  The graft was trimmed to fit the size of the remaining defect.  The graft was then placed in the primary defect, oriented appropriately, and sutured into place.

## 2023-02-24 ENCOUNTER — NON-APPOINTMENT (OUTPATIENT)
Age: 33
End: 2023-02-24

## 2023-08-14 ENCOUNTER — NON-APPOINTMENT (OUTPATIENT)
Age: 33
End: 2023-08-14

## 2023-08-15 ENCOUNTER — EMERGENCY (EMERGENCY)
Facility: HOSPITAL | Age: 33
LOS: 1 days | Discharge: HOME | End: 2023-08-15
Attending: EMERGENCY MEDICINE
Payer: COMMERCIAL

## 2023-08-15 VITALS
HEART RATE: 122 BPM | WEIGHT: 210.1 LBS | HEIGHT: 70 IN | SYSTOLIC BLOOD PRESSURE: 150 MMHG | OXYGEN SATURATION: 95 % | TEMPERATURE: 99 F | DIASTOLIC BLOOD PRESSURE: 98 MMHG | RESPIRATION RATE: 18 BRPM

## 2023-08-15 PROCEDURE — 96374 THER/PROPH/DIAG INJ IV PUSH: CPT

## 2023-08-15 PROCEDURE — 99284 EMERGENCY DEPT VISIT MOD MDM: CPT

## 2023-08-15 PROCEDURE — 99284 EMERGENCY DEPT VISIT MOD MDM: CPT | Mod: 25

## 2023-08-15 RX ORDER — METOCLOPRAMIDE HCL 10 MG
10 TABLET ORAL ONCE
Refills: 0 | Status: COMPLETED | OUTPATIENT
Start: 2023-08-15 | End: 2023-08-15

## 2023-08-15 RX ORDER — SODIUM CHLORIDE 9 MG/ML
1000 INJECTION INTRAMUSCULAR; INTRAVENOUS; SUBCUTANEOUS ONCE
Refills: 0 | Status: COMPLETED | OUTPATIENT
Start: 2023-08-15 | End: 2023-08-15

## 2023-08-15 RX ADMIN — SODIUM CHLORIDE 1000 MILLILITER(S): 9 INJECTION INTRAMUSCULAR; INTRAVENOUS; SUBCUTANEOUS at 17:02

## 2023-08-15 RX ADMIN — Medication 104 MILLIGRAM(S): at 17:13

## 2023-08-15 NOTE — ED PROVIDER NOTE - CLINICAL SUMMARY MEDICAL DECISION MAKING FREE TEXT BOX
32 y/o M with headache secondary to covid - reglan, IV fluids Griseofulvin Counseling:  I discussed with the patient the risks of griseofulvin including but not limited to photosensitivity, cytopenia, liver damage, nausea/vomiting and severe allergy.  The patient understands that this medication is best absorbed when taken with a fatty meal (e.g., ice cream or french fries).

## 2023-08-15 NOTE — ED ADULT NURSE NOTE - NSFALLUNIVINTERV_ED_ALL_ED
Bed/Stretcher in lowest position, wheels locked, appropriate side rails in place/Call bell, personal items and telephone in reach/Instruct patient to call for assistance before getting out of bed/chair/stretcher/Non-slip footwear applied when patient is off stretcher/[place] to call system/Physically safe environment - no spills, clutter or unnecessary equipment/Purposeful proactive rounding/Room/bathroom lighting operational, light cord in reach

## 2023-08-15 NOTE — ED ADULT TRIAGE NOTE - CHIEF COMPLAINT QUOTE
Pt BIBA from home, c/o headache, fever, congestion x 3 days, tested covid + at urgent care today, took tylenol 1 hour PTA

## 2023-08-15 NOTE — ED PROVIDER NOTE - PATIENT PORTAL LINK FT
You can access the FollowMyHealth Patient Portal offered by [facility] by registering at the following website: [email or web address]. By joining [facility]’s FollowMyHealth portal, you will also be able to view your health information using other applications (apps) compatible with our system.

## 2023-08-15 NOTE — ED PROVIDER NOTE - OBJECTIVE STATEMENT
33-year-old Male with a PMHx of peptic ulcers came to the ED positive for COVID-19. He initially went to the urgent care this morning to get tested after experiencing fever, headache, nausea, SOB, body aches, and chills. After coming back home, he felt his symptoms worsen so he came in to the ED for care. Pt denies any sore throat, chest pain, diarrhea, or constipation.

## 2023-08-15 NOTE — ED PROVIDER NOTE - NS ED ATTENDING STATEMENT MOD
This was a shared visit with the AMELIA. I reviewed and verified the documentation and independently performed the documented:

## 2023-08-15 NOTE — ED PROVIDER NOTE - ATTENDING APP SHARED VISIT CONTRIBUTION OF CARE
I, Benedict Roth, have personally performed a face to face diagnostic evaluation on this patient. I have reviewed the AMELIA note and agree with the history, exam and plan of care, except as noted.    34 yo M hx of peptic ulcer p/w fever, cough, congestion, body ache, headache. He was tested positive for covid. no hypoxia. no respiratory distress. lungs clear. patient had tachycardia, likely due to fever response from covid. IV fluid and raglan given for symptomatic support. HR improved to 90s. Outpatient follow up recommended.

## 2023-11-06 ENCOUNTER — NON-APPOINTMENT (OUTPATIENT)
Age: 33
End: 2023-11-06

## 2024-01-01 NOTE — ED ADULT TRIAGE NOTE - NS ED NURSE AMBULANCES
Immunizations:    hepatitis B IntraMuscular Vaccine - Peds: (10-11 @ 14:41)      Synagis:       Screenings:    Latest CCHD screen:  CCHD Screen []: Initial  Pre-Ductal SpO2(%): 97  Post-Ductal SpO2(%): 98  SpO2 Difference(Pre MINUS Post): -1  Extremities Used: Right Hand, Left Foot  Result: Passed  Follow up: Normal Screen- (No follow-up needed)        Latest car seat screen:      Latest hearing screen:        [place] screen:  Screen#: [id number]  Screen Date: 2024  Screen Comment: N/A    Screen#: [id number]  Screen Date: 2024  Screen Comment: N/A    Screen#: [id number]  Screen Date: 2024  Screen Comment: N/A    Screen#: [id number]  Screen Date: 2024  Screen Comment: N/A     [facility]

## 2024-02-12 ENCOUNTER — NON-APPOINTMENT (OUTPATIENT)
Age: 34
End: 2024-02-12

## 2024-05-03 LAB
25(OH)D3 SERPL-MCNC: 13.6 NG/ML
ALBUMIN SERPL ELPH-MCNC: 4.9 G/DL
ALP BLD-CCNC: 84 U/L
ALT SERPL-CCNC: 78 U/L
ANION GAP SERPL CALC-SCNC: 16 MMOL/L
APPEARANCE: CLEAR
AST SERPL-CCNC: 44 U/L
BACTERIA: NEGATIVE
BASOPHILS # BLD AUTO: 0.1 K/UL
BASOPHILS NFR BLD AUTO: 0.8 %
BILIRUB SERPL-MCNC: 0.3 MG/DL
BILIRUBIN URINE: NEGATIVE
BLOOD URINE: ABNORMAL
BUN SERPL-MCNC: 15 MG/DL
CALCIUM SERPL-MCNC: 9.9 MG/DL
CHLORIDE SERPL-SCNC: 102 MMOL/L
CHOLEST SERPL-MCNC: 213 MG/DL
CO2 SERPL-SCNC: 22 MMOL/L
COLOR: YELLOW
CREAT SERPL-MCNC: 0.76 MG/DL
EGFR: 122 ML/MIN/1.73M2
EOSINOPHIL # BLD AUTO: 0.32 K/UL
EOSINOPHIL NFR BLD AUTO: 2.7 %
ESTIMATED AVERAGE GLUCOSE: 114 MG/DL
FERRITIN SERPL-MCNC: 52 NG/ML
FOLATE SERPL-MCNC: 14.6 NG/ML
GLUCOSE QUALITATIVE U: NEGATIVE
GLUCOSE SERPL-MCNC: 91 MG/DL
HAPTOGLOB SERPL-MCNC: 178 MG/DL
HBA1C MFR BLD HPLC: 5.6 %
HCT VFR BLD CALC: 46.6 %
HDLC SERPL-MCNC: 38 MG/DL
HGB BLD-MCNC: 14.8 G/DL
HYALINE CASTS: 0 /LPF
IMM GRANULOCYTES NFR BLD AUTO: 0.6 %
IRON SERPL-MCNC: 59 UG/DL
KETONES URINE: NEGATIVE
LDH SERPL-CCNC: 201 U/L
LDLC SERPL CALC-MCNC: 123 MG/DL
LEUKOCYTE ESTERASE URINE: NEGATIVE
LYMPHOCYTES # BLD AUTO: 2.63 K/UL
LYMPHOCYTES NFR BLD AUTO: 22.3 %
MAGNESIUM SERPL-MCNC: 2.1 MG/DL
MAN DIFF?: NORMAL
MCHC RBC-ENTMCNC: 26.1 PG
MCHC RBC-ENTMCNC: 31.8 GM/DL
MCV RBC AUTO: 82.3 FL
MICROSCOPIC-UA: NORMAL
MONOCYTES # BLD AUTO: 0.94 K/UL
MONOCYTES NFR BLD AUTO: 8 %
NEUTROPHILS # BLD AUTO: 7.73 K/UL
NEUTROPHILS NFR BLD AUTO: 65.6 %
NITRITE URINE: NEGATIVE
NONHDLC SERPL-MCNC: 175 MG/DL
PH URINE: 6.5
PHOSPHATE SERPL-MCNC: 2.8 MG/DL
PLATELET # BLD AUTO: 425 K/UL
POTASSIUM SERPL-SCNC: 4.1 MMOL/L
PROT SERPL-MCNC: 7.5 G/DL
PROTEIN URINE: ABNORMAL
RBC # BLD: 5.66 M/UL
RBC # FLD: 13.8 %
RED BLOOD CELLS URINE: 10 /HPF
SODIUM SERPL-SCNC: 140 MMOL/L
SPECIFIC GRAVITY URINE: 1.03
SQUAMOUS EPITHELIAL CELLS: 0 /HPF
T3FREE SERPL-MCNC: 3.8 PG/ML
T4 FREE SERPL-MCNC: 1.1 NG/DL
TRANSFERRIN SERPL-MCNC: 325 MG/DL
TRIGL SERPL-MCNC: 259 MG/DL
TSH SERPL-ACNC: 1.98 UIU/ML
URATE SERPL-MCNC: 6.5 MG/DL
UROBILINOGEN URINE: ABNORMAL
VIT B12 SERPL-MCNC: 783 PG/ML
WBC # FLD AUTO: 11.79 K/UL
WHITE BLOOD CELLS URINE: 1 /HPF

## 2024-06-07 ENCOUNTER — APPOINTMENT (OUTPATIENT)
Dept: PHYSICAL MEDICINE AND REHAB | Facility: CLINIC | Age: 34
End: 2024-06-07
Payer: COMMERCIAL

## 2024-06-07 VITALS — WEIGHT: 213 LBS | HEIGHT: 67 IN | HEART RATE: 83 BPM | BODY MASS INDEX: 33.43 KG/M2

## 2024-06-07 DIAGNOSIS — M79.18 MYALGIA, OTHER SITE: ICD-10-CM

## 2024-06-07 DIAGNOSIS — M54.2 CERVICALGIA: ICD-10-CM

## 2024-06-07 PROCEDURE — 99204 OFFICE O/P NEW MOD 45 MIN: CPT

## 2024-06-07 RX ORDER — METHYLPREDNISOLONE 4 MG/1
4 TABLET ORAL
Qty: 1 | Refills: 0 | Status: ACTIVE | COMMUNITY
Start: 2024-06-07 | End: 1900-01-01

## 2024-06-07 RX ORDER — CYCLOBENZAPRINE HYDROCHLORIDE 5 MG/1
5 TABLET, FILM COATED ORAL
Qty: 180 | Refills: 0 | Status: ACTIVE | COMMUNITY
Start: 2024-06-07 | End: 1900-01-01

## 2024-06-07 NOTE — REVIEW OF SYSTEMS
[Fever] : no fever [Chills] : no chills [Chest Pain] : no chest pain [Palpitations] : no palpitations [Shortness Of Breath] : no shortness of breath [Incontinence] : no incontinence [Headache] : no headaches [Dizziness] : no dizziness [Difficulty Walking] : no difficulty walking [FreeTextEntry9] : +neck pain

## 2024-06-07 NOTE — PHYSICAL EXAM
[FreeTextEntry1] : Constitutional: In NAD, calm and cooperative Heart: well perfused Lungs: nonlabored breathing MSK (Neck): Inspection: no gross swelling identified Palpation: Tenderness of the left lower cervical paraspinals, left upper trap and left rhomboids.  Focal areas of hyperirritable, palpable, taut bands of muscles that reproduce pain referral pattern and twitch response with palpation ROM: Pain at end cervical extension, rotation and side bending  Strength: 5/5 strength in bilateral upper extremities Reflexes: 2+ Biceps/Brachioradialis reflex bilaterally, Hoffmans negative bilaterally Sensation: Intact to light touch in bilateral upper extremities Special tests: Spurlings test negative bilaterally Facet Loading: negative bilaterally.

## 2024-06-07 NOTE — HISTORY OF PRESENT ILLNESS
[FreeTextEntry1] : Mr. RED BECKFORD is a 34 year male who presents with neck pain   Last saw Ortho Pain Mgmt in 2021/2022 for LBP 2/2 left L5/S1 radic, treated with PT, gabapentin and TFESI.    HPI  06/07/2024 Location: left neck and shoulder  Onset: 6/5/24, patient was doing yardwork, carrying mulch and then felt pain over his left neck and shoulder blade. Has difficulty turning his neck.  Provocation/Palliative: worse with neck movement, in the mornings when he wakes up.  Quality: constant, tight, sharp, jolting Radiation: non-radiating  Severity:  7/10 BP noted to be high, patient states he it is elevated when he is in pain, previously was high when he had back pain. Otherwise is 120-130s SBP and 80-90s DBP at home--his wife is a nurse who checks his vitals. HR 79, is AAOx4, denies CP, SOB, HA, blurry vision, dizziness   Denies any associated numbness. Denies any associated leg weakness. Denies any loss of bowel/bladder control or any groin numbness.   Current pain medications: tylenol    Previous medications trialed:  cyclobenzaprine 5-10mg BID PRN   Previous procedures relevant to complaint: Injections:  2021 - L5-S1 TFESI x2 Surgery: none for spine or joints    Conservative therapy tried: Physical Therapy: + for low back HEP: +weightlifting.      Diagnostic studies reviewed by me: MR CRUZ spine (2021) - L5-S1 left paracentral disc bulge with mod left NF stenosis.

## 2024-06-07 NOTE — ASSESSMENT
[FreeTextEntry1] : Mr. RED BECKFORD is a 34 year male who presents with acute left neck and shoulder pain after performing yardwork. There are no focal deficits on exam, negative Spurling's. Pain is likely 2/2 cervical myofascial pain. At this time, would recommend maximizing conservative management.     Impression: neck pain cervical myofascial pain    Plan:  - Ortho notes reviewed  - MR L spine reviewed - XR C spine ordered - start cyclobenzaprine 5 mg tablets, 1-2 tablets PO TID PRN pain,. Rx sent.  Patient denies a history of CHF, liver dysfunction or arrhythmias.   Patient warned about the sedative nature of this medication and recommended not to drive or to handle heavy machinery - Start medrol dose pack, take as directed on box, rx sent.  Patient warned to avoid use of PO NSAIDS while on oral steroids. Possible side effects, including hyperglycemia, GI upset, and GI bleed, reviewed with patient. Patient instructed to immediately stop medication should she develop any abdominal pain, nausea, vomiting, bloody stools, or BRBPR. - if pain persists after medrol dose pack, may trial short course of PO NSAIDs, but advised caution due to hx of GI ulcer.  - PT referral provided, emphasized on compliance to HEP. - if pain does not improve or worsens, will consider TPI at next visit.  - RTC in 4-6 weeks    The patient expressed verbal understanding and is in agreement with the plan of care. All of the patient's questions and concerns were addressed during today's visit.

## 2024-07-12 ENCOUNTER — APPOINTMENT (OUTPATIENT)
Dept: PHYSICAL MEDICINE AND REHAB | Facility: CLINIC | Age: 34
End: 2024-07-12

## 2024-07-30 ENCOUNTER — NON-APPOINTMENT (OUTPATIENT)
Age: 34
End: 2024-07-30

## 2024-08-26 NOTE — ED ADULT TRIAGE NOTE - ACCOMPANIED BY
Colostomy/Ileostomy/Ileal Conduit: {YES / NO:}       Date of Last BM: ***  No intake or output data in the 24 hours ending 24  No intake/output data recorded.    Safety Concerns:     { ANGELINA Safety Concerns:559337242}    Impairments/Disabilities:      { ANGELINA Impairments/Disabilities:298420898}    Nutrition Therapy:  Current Nutrition Therapy:   { ANGELINA Diet List:275691995}    Routes of Feeding: {[facility] DME Other Feedings:069648114}  Liquids: {Slp liquid thickness:08062}  Daily Fluid Restriction: {[facility] DME Yes amt example:399869700}  Last Modified Barium Swallow with Video (Video Swallowing Test): {Done Not Done Date:612865552}    Treatments at the Time of Hospital Discharge:   Respiratory Treatments: ***  Oxygen Therapy:  {Therapy; copd oxygen:88667}  Ventilator:    { CC Vent List:376981203}    Rehab Therapies: {THERAPEUTIC INTERVENTION:0499513470}  Weight Bearing Status/Restrictions: {[facility] Weight Bearin}  Other Medical Equipment (for information only, NOT a DME order):  {EQUIPMENT:774761772}  Other Treatments: ***    Patient's personal belongings (please select all that are sent with patient):  {[facility] DME Belongings:528773028}    RN SIGNATURE:  {Esignature:023261658}    CASE MANAGEMENT/SOCIAL WORK SECTION    Inpatient Status Date: ***    Readmission Risk Assessment Score:  Readmission Risk              Risk of Unplanned Readmission:  0           Discharging to Facility/ Agency   Name:   Address:  Phone:  Fax:    Dialysis Facility (if applicable)   Name:  Address:  Dialysis Schedule:  Phone:  Fax:    / signature: {Esignature:154691680}    PHYSICIAN SECTION    Prognosis: {Prognosis:8590000721}    Condition at Discharge: { Patient Condition:110071197}    Rehab Potential (if transferring to Rehab): {Prognosis:7856921099}    Recommended Labs or Other Treatments After Discharge: ***    Physician Certification: I certify the above information and transfer of Cam BUTLER 
EMT/paramedic

## 2024-11-05 ENCOUNTER — APPOINTMENT (OUTPATIENT)
Dept: CARDIOLOGY | Facility: CLINIC | Age: 34
End: 2024-11-05
Payer: COMMERCIAL

## 2024-11-05 VITALS
WEIGHT: 219 LBS | HEIGHT: 67 IN | BODY MASS INDEX: 34.37 KG/M2 | OXYGEN SATURATION: 96 % | SYSTOLIC BLOOD PRESSURE: 134 MMHG | HEART RATE: 103 BPM | RESPIRATION RATE: 17 BRPM | DIASTOLIC BLOOD PRESSURE: 90 MMHG | TEMPERATURE: 98.4 F

## 2024-11-05 DIAGNOSIS — K27.9 PEPTIC ULCER, SITE UNSPECIFIED, UNSPECIFIED AS ACUTE OR CHRONIC, W/OUT HEMORRHAGE OR PERFORATION: ICD-10-CM

## 2024-11-05 DIAGNOSIS — D22.9 MELANOCYTIC NEVI, UNSPECIFIED: ICD-10-CM

## 2024-11-05 DIAGNOSIS — Z00.00 ENCOUNTER FOR GENERAL ADULT MEDICAL EXAMINATION W/OUT ABNORMAL FINDINGS: ICD-10-CM

## 2024-11-05 DIAGNOSIS — Z13.228 ENCOUNTER FOR SCREENING FOR OTHER METABOLIC DISORDERS: ICD-10-CM

## 2024-11-05 DIAGNOSIS — R03.0 ELEVATED BLOOD-PRESSURE READING, W/OUT DIAGNOSIS OF HYPERTENSION: ICD-10-CM

## 2024-11-05 DIAGNOSIS — R31.29 OTHER MICROSCOPIC HEMATURIA: ICD-10-CM

## 2024-11-05 DIAGNOSIS — E55.9 VITAMIN D DEFICIENCY, UNSPECIFIED: ICD-10-CM

## 2024-11-05 DIAGNOSIS — Z23 ENCOUNTER FOR IMMUNIZATION: ICD-10-CM

## 2024-11-05 DIAGNOSIS — M54.2 CERVICALGIA: ICD-10-CM

## 2024-11-05 DIAGNOSIS — E78.5 HYPERLIPIDEMIA, UNSPECIFIED: ICD-10-CM

## 2024-11-05 DIAGNOSIS — Z71.85 ENCOUNTER FOR IMMUNIZATION SAFETY COUNSELING: ICD-10-CM

## 2024-11-05 DIAGNOSIS — E66.3 OVERWEIGHT: ICD-10-CM

## 2024-11-05 PROCEDURE — G0008: CPT

## 2024-11-05 PROCEDURE — 99395 PREV VISIT EST AGE 18-39: CPT | Mod: 25

## 2024-11-05 PROCEDURE — 93000 ELECTROCARDIOGRAM COMPLETE: CPT

## 2024-11-05 PROCEDURE — 90656 IIV3 VACC NO PRSV 0.5 ML IM: CPT

## 2024-11-05 RX ORDER — AMLODIPINE BESYLATE 2.5 MG/1
2.5 TABLET ORAL DAILY
Qty: 90 | Refills: 1 | Status: ACTIVE | COMMUNITY
Start: 2024-11-05 | End: 1900-01-01

## 2024-11-07 ENCOUNTER — NON-APPOINTMENT (OUTPATIENT)
Age: 34
End: 2024-11-07

## 2024-11-07 DIAGNOSIS — D72.829 ELEVATED WHITE BLOOD CELL COUNT, UNSPECIFIED: ICD-10-CM

## 2024-11-07 DIAGNOSIS — R77.9 ABNORMALITY OF PLASMA PROTEIN, UNSPECIFIED: ICD-10-CM

## 2024-11-07 LAB
25(OH)D3 SERPL-MCNC: 18.9 NG/ML
ALBUMIN SERPL ELPH-MCNC: 4.9 G/DL
ALP BLD-CCNC: 84 U/L
ALT SERPL-CCNC: 71 U/L
ANION GAP SERPL CALC-SCNC: 22 MMOL/L
APPEARANCE: CLEAR
AST SERPL-CCNC: 41 U/L
BACTERIA: NEGATIVE /HPF
BASOPHILS # BLD AUTO: 0.11 K/UL
BASOPHILS NFR BLD AUTO: 0.8 %
BILIRUB SERPL-MCNC: 0.3 MG/DL
BILIRUBIN URINE: NEGATIVE
BLOOD URINE: ABNORMAL
BUN SERPL-MCNC: 19 MG/DL
CALCIUM SERPL-MCNC: 10.5 MG/DL
CAST: 0 /LPF
CHLORIDE SERPL-SCNC: 100 MMOL/L
CHOLEST SERPL-MCNC: 233 MG/DL
CO2 SERPL-SCNC: 19 MMOL/L
COLOR: YELLOW
CREAT SERPL-MCNC: 0.84 MG/DL
EGFR: 116 ML/MIN/1.73M2
EOSINOPHIL # BLD AUTO: 0.41 K/UL
EOSINOPHIL NFR BLD AUTO: 2.9 %
EPITHELIAL CELLS: 0 /HPF
ESTIMATED AVERAGE GLUCOSE: 108 MG/DL
GLUCOSE QUALITATIVE U: NEGATIVE MG/DL
GLUCOSE SERPL-MCNC: 60 MG/DL
HBA1C MFR BLD HPLC: 5.4 %
HCT VFR BLD CALC: 50.8 %
HDLC SERPL-MCNC: 41 MG/DL
HGB BLD-MCNC: 16.2 G/DL
IMM GRANULOCYTES NFR BLD AUTO: 0.6 %
KETONES URINE: NEGATIVE MG/DL
LDLC SERPL CALC-MCNC: 160 MG/DL
LEUKOCYTE ESTERASE URINE: NEGATIVE
LYMPHOCYTES # BLD AUTO: 3.35 K/UL
LYMPHOCYTES NFR BLD AUTO: 23.6 %
MAN DIFF?: NORMAL
MCHC RBC-ENTMCNC: 27 PG
MCHC RBC-ENTMCNC: 31.9 G/DL
MCV RBC AUTO: 84.5 FL
MICROSCOPIC-UA: NORMAL
MONOCYTES # BLD AUTO: 1.16 K/UL
MONOCYTES NFR BLD AUTO: 8.2 %
NEUTROPHILS # BLD AUTO: 9.08 K/UL
NEUTROPHILS NFR BLD AUTO: 63.9 %
NITRITE URINE: NEGATIVE
NONHDLC SERPL-MCNC: 192 MG/DL
PH URINE: 6
PLATELET # BLD AUTO: 393 K/UL
POTASSIUM SERPL-SCNC: 3.9 MMOL/L
PROT SERPL-MCNC: 8.4 G/DL
PROTEIN URINE: NORMAL MG/DL
RBC # BLD: 6.01 M/UL
RBC # FLD: 14.1 %
RED BLOOD CELLS URINE: 9 /HPF
SODIUM SERPL-SCNC: 140 MMOL/L
SPECIFIC GRAVITY URINE: 1.02
T4 FREE SERPL-MCNC: 1.1 NG/DL
TRIGL SERPL-MCNC: 176 MG/DL
TSH SERPL-ACNC: 1.93 UIU/ML
UROBILINOGEN URINE: 0.2 MG/DL
WBC # FLD AUTO: 14.19 K/UL
WHITE BLOOD CELLS URINE: 0 /HPF

## 2024-11-13 PROBLEM — D72.829 ELEVATED WBC COUNT: Status: ACTIVE | Noted: 2024-11-13

## 2024-11-13 PROBLEM — R77.9 HIGH SERUM PROTEIN LEVEL: Status: ACTIVE | Noted: 2024-11-13

## 2024-12-20 ENCOUNTER — APPOINTMENT (OUTPATIENT)
Dept: CARDIOLOGY | Facility: CLINIC | Age: 34
End: 2024-12-20
Payer: COMMERCIAL

## 2024-12-20 VITALS
TEMPERATURE: 98.6 F | BODY MASS INDEX: 33.12 KG/M2 | DIASTOLIC BLOOD PRESSURE: 92 MMHG | HEIGHT: 67 IN | OXYGEN SATURATION: 97 % | HEART RATE: 105 BPM | SYSTOLIC BLOOD PRESSURE: 134 MMHG | WEIGHT: 211 LBS

## 2024-12-20 DIAGNOSIS — D72.829 ELEVATED WHITE BLOOD CELL COUNT, UNSPECIFIED: ICD-10-CM

## 2024-12-20 DIAGNOSIS — R31.29 OTHER MICROSCOPIC HEMATURIA: ICD-10-CM

## 2024-12-20 DIAGNOSIS — R77.8 OTHER SPECIFIED ABNORMALITIES OF PLASMA PROTEINS: ICD-10-CM

## 2024-12-20 DIAGNOSIS — E55.9 VITAMIN D DEFICIENCY, UNSPECIFIED: ICD-10-CM

## 2024-12-20 DIAGNOSIS — E78.5 HYPERLIPIDEMIA, UNSPECIFIED: ICD-10-CM

## 2024-12-20 DIAGNOSIS — R79.89 OTHER SPECIFIED ABNORMAL FINDINGS OF BLOOD CHEMISTRY: ICD-10-CM

## 2024-12-20 DIAGNOSIS — K27.9 PEPTIC ULCER, SITE UNSPECIFIED, UNSPECIFIED AS ACUTE OR CHRONIC, W/OUT HEMORRHAGE OR PERFORATION: ICD-10-CM

## 2024-12-20 DIAGNOSIS — I10 ESSENTIAL (PRIMARY) HYPERTENSION: ICD-10-CM

## 2024-12-20 DIAGNOSIS — R53.83 OTHER FATIGUE: ICD-10-CM

## 2024-12-20 PROCEDURE — 93000 ELECTROCARDIOGRAM COMPLETE: CPT

## 2024-12-20 PROCEDURE — 99214 OFFICE O/P EST MOD 30 MIN: CPT

## 2024-12-20 PROCEDURE — G2211 COMPLEX E/M VISIT ADD ON: CPT

## 2024-12-20 RX ORDER — ERGOCALCIFEROL 1.25 MG/1
1.25 MG CAPSULE ORAL
Qty: 6 | Refills: 0 | Status: ACTIVE | COMMUNITY
Start: 2024-12-20 | End: 1900-01-01

## 2024-12-20 RX ORDER — ERGOCALCIFEROL (VITAMIN D2) 50 MCG
50 MCG CAPSULE ORAL
Qty: 90 | Refills: 0 | Status: ACTIVE | COMMUNITY
Start: 2024-12-20 | End: 1900-01-01

## 2024-12-23 LAB
ALBUMIN SERPL ELPH-MCNC: 4.7 G/DL
ALP BLD-CCNC: 91 U/L
ALT SERPL-CCNC: 70 U/L
AST SERPL-CCNC: 36 U/L
BILIRUB DIRECT SERPL-MCNC: 0.1 MG/DL
BILIRUB INDIRECT SERPL-MCNC: 0.4 MG/DL
BILIRUB SERPL-MCNC: 0.5 MG/DL
PROT SERPL-MCNC: 7.9 G/DL

## 2024-12-28 LAB
ALBUMIN MFR SERPL ELPH: 61 %
ALBUMIN SERPL-MCNC: 4.8 G/DL
ALBUMIN/GLOB SERPL: 1.5 RATIO
ALPHA1 GLOB MFR SERPL ELPH: 4.4 %
ALPHA1 GLOB SERPL ELPH-MCNC: 0.3 G/DL
ALPHA2 GLOB MFR SERPL ELPH: 9.8 %
ALPHA2 GLOB SERPL ELPH-MCNC: 0.8 G/DL
B-GLOBULIN MFR SERPL ELPH: 13.8 %
B-GLOBULIN SERPL ELPH-MCNC: 1.1 G/DL
DEPRECATED KAPPA LC FREE/LAMBDA SER: 1.94 RATIO
GAMMA GLOB FLD ELPH-MCNC: 0.9 G/DL
GAMMA GLOB MFR SERPL ELPH: 11 %
IGA SER QL IEP: 410 MG/DL
IGG SER QL IEP: 876 MG/DL
IGM SER QL IEP: 67 MG/DL
INTERPRETATION SERPL IEP-IMP: NORMAL
KAPPA LC CSF-MCNC: 1.16 MG/DL
KAPPA LC SERPL-MCNC: 2.25 MG/DL
M PROTEIN SPEC IFE-MCNC: NORMAL
PROT SERPL-MCNC: 7.9 G/DL
PROT SERPL-MCNC: 7.9 G/DL

## 2025-01-06 ENCOUNTER — TRANSCRIPTION ENCOUNTER (OUTPATIENT)
Age: 35
End: 2025-01-06

## 2025-01-17 ENCOUNTER — NON-APPOINTMENT (OUTPATIENT)
Age: 35
End: 2025-01-17

## 2025-02-13 ENCOUNTER — LABORATORY RESULT (OUTPATIENT)
Age: 35
End: 2025-02-13

## 2025-02-13 ENCOUNTER — APPOINTMENT (OUTPATIENT)
Dept: CARDIOLOGY | Facility: CLINIC | Age: 35
End: 2025-02-13
Payer: COMMERCIAL

## 2025-02-13 VITALS
SYSTOLIC BLOOD PRESSURE: 120 MMHG | TEMPERATURE: 98 F | BODY MASS INDEX: 34.21 KG/M2 | OXYGEN SATURATION: 98 % | HEART RATE: 84 BPM | DIASTOLIC BLOOD PRESSURE: 80 MMHG | WEIGHT: 218 LBS | HEIGHT: 67 IN

## 2025-02-13 DIAGNOSIS — R79.89 OTHER SPECIFIED ABNORMAL FINDINGS OF BLOOD CHEMISTRY: ICD-10-CM

## 2025-02-13 DIAGNOSIS — D22.9 MELANOCYTIC NEVI, UNSPECIFIED: ICD-10-CM

## 2025-02-13 DIAGNOSIS — Z13.228 ENCOUNTER FOR SCREENING FOR OTHER METABOLIC DISORDERS: ICD-10-CM

## 2025-02-13 DIAGNOSIS — I10 ESSENTIAL (PRIMARY) HYPERTENSION: ICD-10-CM

## 2025-02-13 DIAGNOSIS — R31.29 OTHER MICROSCOPIC HEMATURIA: ICD-10-CM

## 2025-02-13 DIAGNOSIS — D72.829 ELEVATED WHITE BLOOD CELL COUNT, UNSPECIFIED: ICD-10-CM

## 2025-02-13 DIAGNOSIS — R77.8 OTHER SPECIFIED ABNORMALITIES OF PLASMA PROTEINS: ICD-10-CM

## 2025-02-13 DIAGNOSIS — E66.3 OVERWEIGHT: ICD-10-CM

## 2025-02-13 DIAGNOSIS — K27.9 PEPTIC ULCER, SITE UNSPECIFIED, UNSPECIFIED AS ACUTE OR CHRONIC, W/OUT HEMORRHAGE OR PERFORATION: ICD-10-CM

## 2025-02-13 DIAGNOSIS — E78.5 HYPERLIPIDEMIA, UNSPECIFIED: ICD-10-CM

## 2025-02-13 PROCEDURE — 99214 OFFICE O/P EST MOD 30 MIN: CPT

## 2025-02-13 PROCEDURE — G2211 COMPLEX E/M VISIT ADD ON: CPT

## 2025-02-14 LAB
ALBUMIN SERPL ELPH-MCNC: 4.9 G/DL
ALP BLD-CCNC: 93 U/L
ALT SERPL-CCNC: 49 U/L
ANION GAP SERPL CALC-SCNC: 16 MMOL/L
APPEARANCE: CLEAR
AST SERPL-CCNC: 23 U/L
BACTERIA: NEGATIVE /HPF
BASOPHILS # BLD AUTO: 0.09 K/UL
BASOPHILS NFR BLD AUTO: 0.7 %
BILIRUB SERPL-MCNC: 0.5 MG/DL
BILIRUBIN URINE: NEGATIVE
BLOOD URINE: NEGATIVE
BUN SERPL-MCNC: 18 MG/DL
CALCIUM SERPL-MCNC: 10 MG/DL
CAST: 0 /LPF
CHLORIDE SERPL-SCNC: 103 MMOL/L
CHOLEST SERPL-MCNC: 210 MG/DL
CO2 SERPL-SCNC: 22 MMOL/L
COLOR: NORMAL
CREAT SERPL-MCNC: 0.73 MG/DL
EGFR: 121 ML/MIN/1.73M2
EOSINOPHIL # BLD AUTO: 0.25 K/UL
EOSINOPHIL NFR BLD AUTO: 2 %
EPITHELIAL CELLS: 0 /HPF
ESTIMATED AVERAGE GLUCOSE: 108 MG/DL
GGT SERPL-CCNC: 64 U/L
GLUCOSE QUALITATIVE U: NEGATIVE MG/DL
GLUCOSE SERPL-MCNC: 134 MG/DL
HBA1C MFR BLD HPLC: 5.4 %
HCT VFR BLD CALC: 47.2 %
HDLC SERPL-MCNC: 46 MG/DL
HGB BLD-MCNC: 14.9 G/DL
IMM GRANULOCYTES NFR BLD AUTO: 0.5 %
KETONES URINE: NEGATIVE MG/DL
LDLC SERPL CALC-MCNC: 144 MG/DL
LEUKOCYTE ESTERASE URINE: NEGATIVE
LYMPHOCYTES # BLD AUTO: 2.07 K/UL
LYMPHOCYTES NFR BLD AUTO: 16.3 %
MAN DIFF?: NORMAL
MCHC RBC-ENTMCNC: 27.2 PG
MCHC RBC-ENTMCNC: 31.6 G/DL
MCV RBC AUTO: 86.3 FL
MICROSCOPIC-UA: NORMAL
MONOCYTES # BLD AUTO: 0.85 K/UL
MONOCYTES NFR BLD AUTO: 6.7 %
NEUTROPHILS # BLD AUTO: 9.35 K/UL
NEUTROPHILS NFR BLD AUTO: 73.8 %
NITRITE URINE: NEGATIVE
NONHDLC SERPL-MCNC: 163 MG/DL
PH URINE: 6
PLATELET # BLD AUTO: 382 K/UL
POTASSIUM SERPL-SCNC: 3.9 MMOL/L
PROT SERPL-MCNC: 7.6 G/DL
PROTEIN URINE: NEGATIVE MG/DL
RBC # BLD: 5.47 M/UL
RBC # FLD: 13.8 %
RED BLOOD CELLS URINE: 10 /HPF
SODIUM SERPL-SCNC: 142 MMOL/L
SPECIFIC GRAVITY URINE: 1.03
T4 FREE SERPL-MCNC: 1.1 NG/DL
TRIGL SERPL-MCNC: 110 MG/DL
TSH SERPL-ACNC: 2.9 UIU/ML
UROBILINOGEN URINE: 0.2 MG/DL
WBC # FLD AUTO: 12.67 K/UL
WHITE BLOOD CELLS URINE: 0 /HPF

## 2025-05-12 ENCOUNTER — TRANSCRIPTION ENCOUNTER (OUTPATIENT)
Age: 35
End: 2025-05-12

## 2025-06-11 ENCOUNTER — NON-APPOINTMENT (OUTPATIENT)
Age: 35
End: 2025-06-11

## 2025-06-12 ENCOUNTER — APPOINTMENT (OUTPATIENT)
Dept: CARDIOLOGY | Facility: CLINIC | Age: 35
End: 2025-06-12
Payer: COMMERCIAL

## 2025-06-12 VITALS
OXYGEN SATURATION: 98 % | DIASTOLIC BLOOD PRESSURE: 90 MMHG | WEIGHT: 218 LBS | TEMPERATURE: 98.2 F | SYSTOLIC BLOOD PRESSURE: 130 MMHG | BODY MASS INDEX: 34.21 KG/M2 | HEART RATE: 77 BPM | HEIGHT: 67 IN

## 2025-06-12 PROCEDURE — 93000 ELECTROCARDIOGRAM COMPLETE: CPT

## 2025-06-12 PROCEDURE — 99214 OFFICE O/P EST MOD 30 MIN: CPT

## 2025-06-12 PROCEDURE — G2211 COMPLEX E/M VISIT ADD ON: CPT

## 2025-06-13 LAB
ALBUMIN SERPL ELPH-MCNC: 4.7 G/DL
ALP BLD-CCNC: 87 U/L
ALT SERPL-CCNC: 67 U/L
ANION GAP SERPL CALC-SCNC: 17 MMOL/L
APPEARANCE: CLEAR
AST SERPL-CCNC: 40 U/L
BACTERIA: NEGATIVE /HPF
BASOPHILS # BLD AUTO: 0.09 K/UL
BASOPHILS NFR BLD AUTO: 0.8 %
BILIRUB DIRECT SERPL-MCNC: 0.08 MG/DL
BILIRUB INDIRECT SERPL-MCNC: 0.2 MG/DL
BILIRUB SERPL-MCNC: 0.3 MG/DL
BILIRUBIN URINE: NEGATIVE
BLOOD URINE: ABNORMAL
BUN SERPL-MCNC: 13 MG/DL
CALCIUM SERPL-MCNC: 10 MG/DL
CAST: 0 /LPF
CHLORIDE SERPL-SCNC: 101 MMOL/L
CHOLEST SERPL-MCNC: 195 MG/DL
CO2 SERPL-SCNC: 19 MMOL/L
COLOR: YELLOW
CREAT SERPL-MCNC: 0.78 MG/DL
EGFRCR SERPLBLD CKD-EPI 2021: 118 ML/MIN/1.73M2
EOSINOPHIL # BLD AUTO: 0.31 K/UL
EOSINOPHIL NFR BLD AUTO: 2.9 %
EPITHELIAL CELLS: 0 /HPF
ESTIMATED AVERAGE GLUCOSE: 108 MG/DL
GGT SERPL-CCNC: 51 U/L
GLUCOSE QUALITATIVE U: NEGATIVE MG/DL
GLUCOSE SERPL-MCNC: 87 MG/DL
HBA1C MFR BLD HPLC: 5.4 %
HCT VFR BLD CALC: 48.3 %
HDLC SERPL-MCNC: 39 MG/DL
HGB BLD-MCNC: 14.8 G/DL
IMM GRANULOCYTES NFR BLD AUTO: 0.5 %
KETONES URINE: NEGATIVE MG/DL
LDLC SERPL-MCNC: 123 MG/DL
LEUKOCYTE ESTERASE URINE: ABNORMAL
LYMPHOCYTES # BLD AUTO: 2.64 K/UL
LYMPHOCYTES NFR BLD AUTO: 24.4 %
MAGNESIUM SERPL-MCNC: 2.1 MG/DL
MAN DIFF?: NORMAL
MCHC RBC-ENTMCNC: 26.6 PG
MCHC RBC-ENTMCNC: 30.6 G/DL
MCV RBC AUTO: 86.7 FL
MICROSCOPIC-UA: NORMAL
MONOCYTES # BLD AUTO: 0.92 K/UL
MONOCYTES NFR BLD AUTO: 8.5 %
NEUTROPHILS # BLD AUTO: 6.83 K/UL
NEUTROPHILS NFR BLD AUTO: 62.9 %
NITRITE URINE: NEGATIVE
NONHDLC SERPL-MCNC: 156 MG/DL
PH URINE: 7
PLATELET # BLD AUTO: 402 K/UL
POTASSIUM SERPL-SCNC: 4.2 MMOL/L
PROT SERPL-MCNC: 7.3 G/DL
PROTEIN URINE: NORMAL MG/DL
RBC # BLD: 5.57 M/UL
RBC # FLD: 13.8 %
RED BLOOD CELLS URINE: 9 /HPF
SODIUM SERPL-SCNC: 137 MMOL/L
SPECIFIC GRAVITY URINE: 1.02
T3FREE SERPL-MCNC: 3.68 PG/ML
T4 FREE SERPL-MCNC: 1 NG/DL
TRIGL SERPL-MCNC: 185 MG/DL
TSH SERPL-ACNC: 2.45 UIU/ML
UROBILINOGEN URINE: 0.2 MG/DL
WBC # FLD AUTO: 10.84 K/UL
WHITE BLOOD CELLS URINE: 0 /HPF

## 2025-08-07 ENCOUNTER — APPOINTMENT (OUTPATIENT)
Dept: CARDIOLOGY | Facility: CLINIC | Age: 35
End: 2025-08-07

## 2025-08-07 DIAGNOSIS — E66.3 OVERWEIGHT: ICD-10-CM

## 2025-08-07 DIAGNOSIS — E78.5 HYPERLIPIDEMIA, UNSPECIFIED: ICD-10-CM

## 2025-08-07 DIAGNOSIS — I10 ESSENTIAL (PRIMARY) HYPERTENSION: ICD-10-CM
